# Patient Record
Sex: FEMALE | Race: WHITE | NOT HISPANIC OR LATINO | Employment: FULL TIME | URBAN - METROPOLITAN AREA
[De-identification: names, ages, dates, MRNs, and addresses within clinical notes are randomized per-mention and may not be internally consistent; named-entity substitution may affect disease eponyms.]

---

## 2018-09-20 ENCOUNTER — HOSPITAL ENCOUNTER (INPATIENT)
Facility: HOSPITAL | Age: 58
LOS: 5 days | Discharge: HOME | DRG: 918 | End: 2018-09-26
Attending: EMERGENCY MEDICINE | Admitting: PSYCHIATRY & NEUROLOGY

## 2018-09-20 DIAGNOSIS — T14.91XA SUICIDE ATTEMPT (HCC): Primary | ICD-10-CM

## 2018-09-20 DIAGNOSIS — T50.902A INTENTIONAL DRUG OVERDOSE, INITIAL ENCOUNTER (HCC): ICD-10-CM

## 2018-09-20 DIAGNOSIS — F33.2 SEVERE EPISODE OF RECURRENT MAJOR DEPRESSIVE DISORDER, WITHOUT PSYCHOTIC FEATURES (HCC): Chronic | ICD-10-CM

## 2018-09-20 DIAGNOSIS — F17.200 NICOTINE DEPENDENCE: ICD-10-CM

## 2018-09-20 DIAGNOSIS — R45.851 SUICIDAL IDEATIONS: ICD-10-CM

## 2018-09-20 LAB
ALBUMIN SERPL BCP-MCNC: 4.1 G/DL (ref 3.5–5)
ALP SERPL-CCNC: 65 U/L (ref 46–116)
ALT SERPL W P-5'-P-CCNC: 43 U/L (ref 12–78)
ANION GAP SERPL CALCULATED.3IONS-SCNC: 15 MMOL/L (ref 4–13)
APAP SERPL-MCNC: <2 UG/ML (ref 10–30)
AST SERPL W P-5'-P-CCNC: 20 U/L (ref 5–45)
BASOPHILS # BLD MANUAL: 0 THOUSAND/UL (ref 0–0.1)
BASOPHILS NFR MAR MANUAL: 0 % (ref 0–1)
BILIRUB SERPL-MCNC: 0.4 MG/DL (ref 0.2–1)
BUN SERPL-MCNC: 10 MG/DL (ref 5–25)
CALCIUM SERPL-MCNC: 9.6 MG/DL (ref 8.3–10.1)
CHLORIDE SERPL-SCNC: 103 MMOL/L (ref 100–108)
CO2 SERPL-SCNC: 24 MMOL/L (ref 21–32)
CREAT SERPL-MCNC: 0.81 MG/DL (ref 0.6–1.3)
EOSINOPHIL # BLD MANUAL: 0.26 THOUSAND/UL (ref 0–0.4)
EOSINOPHIL NFR BLD MANUAL: 3 % (ref 0–6)
ERYTHROCYTE [DISTWIDTH] IN BLOOD BY AUTOMATED COUNT: 12.8 % (ref 11.6–15.1)
ETHANOL SERPL-MCNC: 5 MG/DL (ref 0–3)
GFR SERPL CREATININE-BSD FRML MDRD: 82 ML/MIN/1.73SQ M
GLUCOSE SERPL-MCNC: 98 MG/DL (ref 65–140)
HCT VFR BLD AUTO: 45.2 % (ref 34.8–46.1)
HGB BLD-MCNC: 15.2 G/DL (ref 11.5–15.4)
LYMPHOCYTES # BLD AUTO: 3.35 THOUSAND/UL (ref 0.6–4.47)
LYMPHOCYTES # BLD AUTO: 38 % (ref 14–44)
MCH RBC QN AUTO: 31 PG (ref 26.8–34.3)
MCHC RBC AUTO-ENTMCNC: 33.6 G/DL (ref 31.4–37.4)
MCV RBC AUTO: 92 FL (ref 82–98)
MONOCYTES # BLD AUTO: 0.53 THOUSAND/UL (ref 0–1.22)
MONOCYTES NFR BLD: 6 % (ref 4–12)
NEUTROPHILS # BLD MANUAL: 4.32 THOUSAND/UL (ref 1.85–7.62)
NEUTS SEG NFR BLD AUTO: 49 % (ref 43–75)
NRBC BLD AUTO-RTO: 0 /100 WBCS
PLATELET # BLD AUTO: 271 THOUSANDS/UL (ref 149–390)
PLATELET BLD QL SMEAR: ADEQUATE
PMV BLD AUTO: 11 FL (ref 8.9–12.7)
POTASSIUM SERPL-SCNC: 4 MMOL/L (ref 3.5–5.3)
PROT SERPL-MCNC: 7.3 G/DL (ref 6.4–8.2)
RBC # BLD AUTO: 4.9 MILLION/UL (ref 3.81–5.12)
RBC MORPH BLD: NORMAL
SALICYLATES SERPL-MCNC: 6.9 MG/DL (ref 3–20)
SODIUM SERPL-SCNC: 142 MMOL/L (ref 136–145)
TOTAL CELLS COUNTED SPEC: 100
VARIANT LYMPHS # BLD AUTO: 4 %
WBC # BLD AUTO: 8.82 THOUSAND/UL (ref 4.31–10.16)

## 2018-09-20 PROCEDURE — 85007 BL SMEAR W/DIFF WBC COUNT: CPT | Performed by: EMERGENCY MEDICINE

## 2018-09-20 PROCEDURE — 81002 URINALYSIS NONAUTO W/O SCOPE: CPT | Performed by: EMERGENCY MEDICINE

## 2018-09-20 PROCEDURE — 85027 COMPLETE CBC AUTOMATED: CPT | Performed by: EMERGENCY MEDICINE

## 2018-09-20 PROCEDURE — 80053 COMPREHEN METABOLIC PANEL: CPT | Performed by: EMERGENCY MEDICINE

## 2018-09-20 PROCEDURE — 80320 DRUG SCREEN QUANTALCOHOLS: CPT | Performed by: EMERGENCY MEDICINE

## 2018-09-20 PROCEDURE — 93005 ELECTROCARDIOGRAM TRACING: CPT

## 2018-09-20 PROCEDURE — 96360 HYDRATION IV INFUSION INIT: CPT

## 2018-09-20 PROCEDURE — 80329 ANALGESICS NON-OPIOID 1 OR 2: CPT | Performed by: EMERGENCY MEDICINE

## 2018-09-20 RX ADMIN — SODIUM CHLORIDE 500 ML: 0.9 INJECTION, SOLUTION INTRAVENOUS at 23:53

## 2018-09-21 PROBLEM — F41.1 GENERALIZED ANXIETY DISORDER: Status: ACTIVE | Noted: 2018-09-21

## 2018-09-21 PROBLEM — F41.1 GENERALIZED ANXIETY DISORDER: Chronic | Status: ACTIVE | Noted: 2018-09-21

## 2018-09-21 PROBLEM — F33.2 SEVERE EPISODE OF RECURRENT MAJOR DEPRESSIVE DISORDER, WITHOUT PSYCHOTIC FEATURES (HCC): Status: ACTIVE | Noted: 2018-09-21

## 2018-09-21 PROBLEM — R45.851 SUICIDAL IDEATIONS: Status: ACTIVE | Noted: 2018-09-21

## 2018-09-21 PROBLEM — F33.2 SEVERE EPISODE OF RECURRENT MAJOR DEPRESSIVE DISORDER, WITHOUT PSYCHOTIC FEATURES (HCC): Chronic | Status: ACTIVE | Noted: 2018-09-21

## 2018-09-21 LAB
AMPHETAMINES SERPL QL SCN: NEGATIVE
ATRIAL RATE: 55 BPM
ATRIAL RATE: 59 BPM
BARBITURATES UR QL: NEGATIVE
BENZODIAZ UR QL: NEGATIVE
CLARITY, POC: ABNORMAL
COCAINE UR QL: NEGATIVE
COLOR, POC: ABNORMAL
EXT BILIRUBIN, UA: ABNORMAL
EXT BLOOD URINE: ABNORMAL
EXT GLUCOSE, UA: ABNORMAL
EXT KETONES: ABNORMAL
EXT NITRITE, UA: ABNORMAL
EXT PH, UA: 6
EXT PROTEIN, UA: ABNORMAL
EXT SPECIFIC GRAVITY, UA: 1
EXT UROBILINOGEN: 0.2
METHADONE UR QL: NEGATIVE
OPIATES UR QL SCN: NEGATIVE
P AXIS: 72 DEGREES
P AXIS: 75 DEGREES
PCP UR QL: NEGATIVE
PR INTERVAL: 158 MS
PR INTERVAL: 180 MS
QRS AXIS: 96 DEGREES
QRS AXIS: 96 DEGREES
QRSD INTERVAL: 80 MS
QRSD INTERVAL: 84 MS
QT INTERVAL: 414 MS
QT INTERVAL: 420 MS
QTC INTERVAL: 401 MS
QTC INTERVAL: 409 MS
T WAVE AXIS: 77 DEGREES
T WAVE AXIS: 82 DEGREES
THC UR QL: POSITIVE
VENTRICULAR RATE: 55 BPM
VENTRICULAR RATE: 59 BPM
WBC # BLD EST: ABNORMAL 10*3/UL

## 2018-09-21 PROCEDURE — 99222 1ST HOSP IP/OBS MODERATE 55: CPT | Performed by: PSYCHIATRY & NEUROLOGY

## 2018-09-21 PROCEDURE — 93010 ELECTROCARDIOGRAM REPORT: CPT | Performed by: INTERNAL MEDICINE

## 2018-09-21 PROCEDURE — 99252 IP/OBS CONSLTJ NEW/EST SF 35: CPT | Performed by: PHYSICIAN ASSISTANT

## 2018-09-21 PROCEDURE — 99285 EMERGENCY DEPT VISIT HI MDM: CPT

## 2018-09-21 PROCEDURE — 99449 NTRPROF PH1/NTRNET/EHR 31/>: CPT | Performed by: EMERGENCY MEDICINE

## 2018-09-21 PROCEDURE — 93005 ELECTROCARDIOGRAM TRACING: CPT

## 2018-09-21 PROCEDURE — 96361 HYDRATE IV INFUSION ADD-ON: CPT

## 2018-09-21 PROCEDURE — 80307 DRUG TEST PRSMV CHEM ANLYZR: CPT | Performed by: EMERGENCY MEDICINE

## 2018-09-21 RX ORDER — LORAZEPAM 1 MG/1
1 TABLET ORAL EVERY 4 HOURS PRN
Status: DISCONTINUED | OUTPATIENT
Start: 2018-09-21 | End: 2018-09-26 | Stop reason: HOSPADM

## 2018-09-21 RX ORDER — MIRTAZAPINE 15 MG/1
15 TABLET, FILM COATED ORAL
Status: DISCONTINUED | OUTPATIENT
Start: 2018-09-21 | End: 2018-09-26 | Stop reason: HOSPADM

## 2018-09-21 RX ORDER — LORAZEPAM 2 MG/ML
2 INJECTION INTRAMUSCULAR EVERY 4 HOURS PRN
Status: DISCONTINUED | OUTPATIENT
Start: 2018-09-21 | End: 2018-09-26 | Stop reason: HOSPADM

## 2018-09-21 RX ORDER — MAGNESIUM HYDROXIDE/ALUMINUM HYDROXICE/SIMETHICONE 120; 1200; 1200 MG/30ML; MG/30ML; MG/30ML
30 SUSPENSION ORAL EVERY 4 HOURS PRN
Status: DISCONTINUED | OUTPATIENT
Start: 2018-09-21 | End: 2018-09-26 | Stop reason: HOSPADM

## 2018-09-21 RX ORDER — OLANZAPINE 5 MG/1
5 TABLET ORAL
Status: DISCONTINUED | OUTPATIENT
Start: 2018-09-21 | End: 2018-09-26 | Stop reason: HOSPADM

## 2018-09-21 RX ORDER — TRAZODONE HYDROCHLORIDE 50 MG/1
50 TABLET ORAL
Status: DISCONTINUED | OUTPATIENT
Start: 2018-09-21 | End: 2018-09-26 | Stop reason: HOSPADM

## 2018-09-21 RX ORDER — NICOTINE 21 MG/24HR
14 PATCH, TRANSDERMAL 24 HOURS TRANSDERMAL ONCE
Status: DISCONTINUED | OUTPATIENT
Start: 2018-09-21 | End: 2018-09-21 | Stop reason: SDUPTHER

## 2018-09-21 RX ORDER — NICOTINE 21 MG/24HR
1 PATCH, TRANSDERMAL 24 HOURS TRANSDERMAL DAILY
Status: DISCONTINUED | OUTPATIENT
Start: 2018-09-21 | End: 2018-09-26 | Stop reason: HOSPADM

## 2018-09-21 RX ORDER — BENZTROPINE MESYLATE 1 MG/1
1 TABLET ORAL EVERY 6 HOURS PRN
Status: DISCONTINUED | OUTPATIENT
Start: 2018-09-21 | End: 2018-09-26 | Stop reason: HOSPADM

## 2018-09-21 RX ORDER — ACETAMINOPHEN 325 MG/1
650 TABLET ORAL EVERY 6 HOURS PRN
Status: DISCONTINUED | OUTPATIENT
Start: 2018-09-21 | End: 2018-09-22

## 2018-09-21 RX ORDER — BENZTROPINE MESYLATE 1 MG/ML
1 INJECTION INTRAMUSCULAR; INTRAVENOUS EVERY 6 HOURS PRN
Status: DISCONTINUED | OUTPATIENT
Start: 2018-09-21 | End: 2018-09-26 | Stop reason: HOSPADM

## 2018-09-21 RX ADMIN — NICOTINE 1 PATCH: 21 PATCH, EXTENDED RELEASE TRANSDERMAL at 14:13

## 2018-09-21 RX ADMIN — NICOTINE 14 MG: 14 PATCH, EXTENDED RELEASE TRANSDERMAL at 02:21

## 2018-09-21 RX ADMIN — MIRTAZAPINE 15 MG: 15 TABLET, FILM COATED ORAL at 21:13

## 2018-09-21 NOTE — CONSULTS
TELEPSYCHIATRY TELEPHONE ADMISSION NOTE      I was consulted by phone to review the case of this 51yo woman who was medically cleared and admitted for suicide attempt via overdose on Trazodone  PMH negative for significant medical issues  NKDA  AVSS, reassuring admission labs  UDS+cannabis  BAL 5  Impression:  Depressive disorder, unspecified    Plan:   --Admit to psychiatry under voluntary status  --Suicide precautions  --Routine admission orders placed           Lindajo Sever, MD   Psychiatrist

## 2018-09-21 NOTE — DISCHARGE INSTR - OTHER ORDERS
In your discharge folder is a return to work note  Included in discharge folder is information on MARLYN ([address] on Mental Illness) which overs various support groups/education/resources  [facility]  24/7: [phone]  [place]  [address]    [facility] has 3 crisis centers:   [employer] at [facility] at [facility] at [facility] services are accessible by phone and through three [facility] hospital Emergency Rooms to individuals, families, and law enforcement  Psychiatric and treatment planning is available, providing for evaluation and disposition and the immediate start of medication and treatment  Substance abuse crisis issues are also addressed  Access to psychiatric consultation is limited to a few hours each weekday  These services are available 24/7 at [facility] AT [facility] at [facility] ([facility] [address]; [phone]) and [employer] at [facility], [address] [facility]; [phone]  The most active crisis unit is [facility] AT [facility] at [address], [address] [place] [address]; [phone])  Suicide Prevention Lifeline  (972) 343-TALK or (579) New 0657 (869) 093-VGAO    Crisis Text Line is free, 24/7 support for those in crisis  Text HOME to 822533 from anywhere in the [place] to text with a trained Crisis Counselor  Peer Recovery Warmline through [facility] AT [facility], hours of operation are 1:00 PM to 5:00 PM, Monday through Friday, except major holidays    Warmline#:258-345-5408    [facility] [address] [phone] ·   [address]  [place], [place], On license of [facility]  Provides free, anonymous and confidential telephone helpline services to help with severe mental health issues to financial concerns, family issues or simply a desire to be heard
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
## 2018-09-21 NOTE — PROGRESS NOTES
Belongings Brought In:  Slippers  deoderant  Toothpaste  Toothbrush  Pants  Fleece jacket  Shirts  Underwear  Socks  Bra  Makeup  Papers from [employer]

## 2018-09-21 NOTE — ED NOTES
There was confusion earlier when patient arrived because the police officers who brought her in completed the petitioner section of a 302 and then contacted [facility] and had it delegated prior to the patient being brought to the ED  They did not bring the 302, however, plus when the patient arrived it was discovered that she wanted to sign in voluntarily and had never been offered that option  By that time there was a delegated 302 at the [facility] office, but none present here with the patient  The patient was given and did sign a 201 instead  [facility] worker called to say she had the 36 and either someone could come get it or she could try to have it brought over in he am  Explained the situation and the patient's desire for a 201  [place] crisis worker contacted the [employer], Everardo Price, who instructed that our physician should complete only page 7 of a 302 indicating the patient signed voluntarily, and then it should be faxed to her office  Form completed properly by Dr Padma Khanna and faxed to [facility] office as requested  Signed 201 on patient's chart  Patient reporting she has no medical insurance  Her social security number was not on the chart  She reported it was   EVS confirms that social is not on file  Patient referred to [facility]

## 2018-09-21 NOTE — ED PROVIDER NOTES
History  Chief Complaint   Patient presents with    Overdose - Intentional     pt presents to ER after driving out to somewhere seculded and taking eight 50mg pills of her trazadone  pt then waited about 2 hours before calling 80      56year old female brought in by EMS for evaluation after suicide attempt  Patient had taken 400 mg trazodone and then drove to an abandoned parking lot in an attempt to end her life  Approximately 2 hours later she had regretted her decision and was afraid so called 911  Patient states that she has been feeling overwhelmed and had an argument with her  which triggered the sensation of sudden, severe hopelessness resulting in her attempt  Patient had been seeing a therapist with 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  No prior suicide attempts or psychiatric admisions  No chest pain, shortness of breath, lightheadedness, dizziness or palpitations  History provided by:  Patient and EMS personnel  Overdose - Intentional   Ingested substance:  Prescription medication  Time since incident:  2 hours  Ingestion amount:  400 mg trazodone  Witnesses present: no    Called poison control: no    Incident location: injested at home and then drove to a parking lot  Context: intentional ingestion and suicide attempt    Associated symptoms: no abdominal pain, no chest pain, no cough, no diarrhea, no headaches, no nausea, no shortness of breath and no vomiting    Risk factors: no hx of self injury and no hx of sucide attempts        Prior to Admission Medications   Prescriptions Last Dose Informant Patient Reported? Taking? TRAZODONE HCL PO   Yes Yes   Sig: Take 50 mg by mouth daily at bedtime      Facility-Administered Medications: None       History reviewed   No pertinent past medical history  Past Surgical History:   Procedure Laterality Date    HYSTERECTOMY         History reviewed  No pertinent family history  I have reviewed and agree with the history as documented  Social History   Substance Use Topics    Smoking status: Current Every Day Smoker     Packs/day: 0 50    Smokeless tobacco: Never Used    Alcohol use Yes      Comment: "sometimes"        Review of Systems   Constitutional: Negative for appetite change, chills and fever  HENT: Negative for congestion, rhinorrhea and sore throat  Respiratory: Negative for cough, chest tightness and shortness of breath  Cardiovascular: Negative for chest pain, palpitations and leg swelling  Gastrointestinal: Negative for abdominal pain, constipation, diarrhea, nausea and vomiting  Genitourinary: Negative for dysuria, frequency and hematuria  Musculoskeletal: Negative for myalgias, neck pain and neck stiffness  Skin: Negative for pallor  Neurological: Negative for syncope, weakness and headaches  Psychiatric/Behavioral: Positive for suicidal ideas  All other systems reviewed and are negative  Physical Exam  Physical Exam   Constitutional: She is oriented to person, place, and time  She appears well-developed and well-nourished  Non-toxic appearance  No distress  HENT:   Head: Normocephalic and atraumatic  Eyes: Conjunctivae and EOM are normal  Pupils are equal, round, and reactive to light  Neck: Normal range of motion  Neck supple  No tracheal deviation present  No thyromegaly present  Cardiovascular: Normal rate, regular rhythm, normal heart sounds and intact distal pulses  Pulmonary/Chest: Effort normal and breath sounds normal    Abdominal: Soft  Bowel sounds are normal  She exhibits no distension  There is no tenderness  Lymphadenopathy:     She has no cervical adenopathy  Neurological: She is alert and oriented to person, place, and time  Skin: Skin is warm and dry  She is not diaphoretic  Psychiatric: She exhibits a depressed mood  She expresses suicidal ideation  She expresses no homicidal ideation  She expresses suicidal plans  Tearful    Nursing note and vitals reviewed  Vital Signs  ED Triage Vitals   Temperature Pulse Respirations Blood Pressure SpO2   09/20/18 2218 09/20/18 2211 09/20/18 2211 09/20/18 2211 09/20/18 2211   97 9 °F (36 6 °C) 71 20 157/85 98 %      Temp Source Heart Rate Source Patient Position - Orthostatic VS BP Location FiO2 (%)   09/20/18 2218 09/20/18 2211 09/20/18 2211 09/20/18 2211 --   Temporal Monitor Lying Right arm       Pain Score       09/20/18 2211       No Pain           Vitals:    09/20/18 2300 09/20/18 2315 09/20/18 2330 09/21/18 0045   BP: 121/66 105/57 98/53 105/55   Pulse: 62 62 58 63   Patient Position - Orthostatic VS:           Visual Acuity      ED Medications  Medications   nicotine (NICODERM CQ) 14 mg/24hr TD 24 hr patch 14 mg (14 mg Transdermal Medication Applied 9/21/18 0221)    EMS REPLENISHMENT MED ( Does not apply Given to EMS 9/20/18 2246)   sodium chloride 0 9 % bolus 500 mL (0 mL Intravenous Stopped 9/21/18 0132)       Diagnostic Studies  Results Reviewed     Procedure Component Value Units Date/Time    Rapid drug screen, urine [[id number]]  (Abnormal) Collected:  09/21/18 0114    Lab Status:  Final result Specimen:  Urine from Urine, Clean Catch Updated:  09/21/18 0146     Amph/Meth UR Negative     Barbiturate Ur Negative     Benzodiazepine Urine Negative     Cocaine Urine Negative     Methadone Urine Negative     Opiate Urine Negative     PCP Ur Negative     THC Urine Positive (A)    Narrative:         Presumptive report  If requested, specimen will be sent to reference lab for confirmation  FOR MEDICAL PURPOSES ONLY  IF CONFIRMATION NEEDED PLEASE CONTACT THE LAB WITHIN 5 DAYS      Drug Screen Cutoff Levels:  AMPHETAMINE/METHAMPHETAMINES  1000 ng/mL  BARBITURATES     200 ng/mL  BENZODIAZEPINES     200 ng/mL  COCAINE      300 ng/mL  METHADONE      300 ng/mL  OPIATES      300 ng/mL  PHENCYCLIDINE     25 ng/mL  THC       50 ng/mL    POCT urinalysis dipstick [[id number]]  (Abnormal) Resulted:  09/21/18 0117    Lab Status:  Final result Specimen:  Urine Updated:  09/21/18 0117     Color, UA straw     Clarity, UA hazy     EXT Glucose, UA (Ref: Negative) neg     EXT Bilirubin, UA (Ref: Negative) neg     Ketones, UA (Ref: Negative) neg     EXT Spec Grav, UA (Ref:1 003-1 030) 1 005     Blood, UA (Ref: Negative) neg     EXT pH, UA (Ref: 4 5-8 0) 6 0     EXT Protein, UA (Ref: Negative) neg     Urobilinogen, UA (Ref: 0 2- 1 0) 0 2      Leukocytes, UA (Ref: Negative) trace     Nitrite, UA (Ref: Negative) neg    CBC and differential [[id number]] Collected:  09/20/18 2236    Lab Status:  Final result Specimen:  Blood from Arm, Left Updated:  09/20/18 2346     WBC 8 82 Thousand/uL      RBC 4 90 Million/uL      Hemoglobin 15 2 g/dL      Hematocrit 45 2 %      MCV 92 fL      MCH 31 0 pg      MCHC 33 6 g/dL      RDW 12 8 %      MPV 11 0 fL      Platelets 585 Thousands/uL      nRBC 0 /100 WBCs     Narrative: This is an appended report  These results have been appended to a previously verified report      Acetaminophen level [[id number]]  (Abnormal) Collected:  09/20/18 2237    Lab Status:  Final result Specimen:  Blood from Arm, Left Updated:  09/20/18 2333     Acetaminophen Level <2 (L) ug/mL     Comprehensive metabolic panel [[id number]]  (Abnormal) Collected:  09/20/18 2237    Lab Status:  Final result Specimen:  Blood from Arm, Left Updated:  09/20/18 2319     Sodium 142 mmol/L      Potassium 4 0 mmol/L      Chloride 103 mmol/L      CO2 24 mmol/L      ANION GAP 15 (H) mmol/L      BUN 10 mg/dL      Creatinine 0 81 mg/dL      Glucose 98 mg/dL      Calcium 9 6 mg/dL      AST 20 U/L      ALT 43 U/L      Alkaline Phosphatase 65 U/L      Total Protein 7 3 g/dL      Albumin 4 1 g/dL      Total Bilirubin 0 40 mg/dL      eGFR 82 ml/min/1 73sq m     Narrative: National Kidney Disease Education Program recommendations are as follows:  GFR calculation is accurate only with a steady state creatinine  Chronic Kidney disease less than 60 ml/min/1 73 sq  meters  Kidney failure less than 15 ml/min/1 73 sq  meters  Salicylate level [[id number]]  (Normal) Collected:  09/20/18 2237    Lab Status:  Final result Specimen:  Blood from Arm, Left Updated:  94/72/86 2397     Salicylate Lvl 6 9 mg/dL     Ethanol [[id number]]  (Abnormal) Collected:  09/20/18 2236    Lab Status:  Final result Specimen:  Blood from Arm, Left Updated:  09/20/18 2315     Ethanol Lvl 5 (H) mg/dL                  No orders to display              Procedures  ECG 12 Lead Documentation  Date/Time: 9/20/2018 10:07 PM  Performed by: Yomaira Smith by: Jen Mon     Indications / Diagnosis:  Overdose  ECG reviewed by me, the ED Provider: yes    Patient location:  ED  Previous ECG:     Previous ECG:  Unavailable  Interpretation:     Interpretation: non-specific    Rate:     ECG rate:  59    ECG rate assessment: bradycardic    Rhythm:     Rhythm: sinus bradycardia    Ectopy:     Ectopy: none    QRS:     QRS axis:  Right    QRS intervals:  Normal  Conduction:     Conduction: normal    ST segments:     ST segments:  Normal  T waves:     T waves: inverted      Inverted:  AVL and V2           Phone Contacts  ED Phone Contact    ED Course  ED Course as of Sep 21 0421   Fri Sep 21, 2018   0029 Patient signed 12    0151 THC URINE: (!) Positive                               MDM  Number of Diagnoses or Management Options  Intentional drug overdose, initial encounter [facility]): new and requires workup  Diagnosis management comments: 56year old female brought in for evaluation after suicide attempt  No clonus  Hemodynamically stable  EKG unremarkable  Case discussed with  on call  Patient monitored in the emergency department on cardiac monitor with no acute events   Medically cleared for psychiatric evaluation  Crisis consulted  Patient signed 12 for voluntary psychiatric admission  Amount and/or Complexity of Data Reviewed  Clinical lab tests: ordered and reviewed    Patient Progress  Patient progress: stable    CritCare Time    Disposition  Final diagnoses:   Intentional drug overdose, initial encounter ([place] [address] )   Suicide attempt [facility])     Time reflects when diagnosis was documented in both MDM as applicable and the Disposition within this note     Time User Action Codes Description Comment    9/20/2018 11:43 PM Louetta Charline Add [T50 902A] Intentional drug overdose, initial encounter ([place] [address] )     9/21/2018  3:25 AM Summer Bard C Add [R45 851] Suicidal ideations     9/21/2018  3:25 AM Summer Bard C Modify [R45 851] Suicidal ideations     9/21/2018  4:21 AM Prashant Singh Add Red Adriane Suicide attempt ([place] [address] )     9/21/2018  4:21 AM Louetta Charline Modify [T50 902A] Intentional drug overdose, initial encounter ([place] [address] )     9/21/2018  4:21 AM Prashant Singh Modify Red Adriane Suicide attempt [facility])       ED Disposition     ED Disposition Condition Comment    Transfer to [address] [place] [address] should be transferred to [facility] and has been medically cleared  MD Documentation      Most Recent Value   Accepting Physician  Rene Diaz Name, Nissa Mcnair [place]   Sending MD  [facility] Name, [address] [place] [address]      Follow-up Information    None         Patient's Medications   Discharge Prescriptions    No medications on file     No discharge procedures on file      ED Provider  Electronically Signed by           Teddy Duffy MD  09/21/18 4866

## 2018-09-21 NOTE — PLAN OF CARE
Problem: DISCHARGE PLANNING  Goal: Discharge to home or other facility with appropriate resources  INTERVENTIONS:  - Identify barriers to discharge w/patient and caregiver  - Arrange for needed discharge resources and transportation as appropriate  - Identify discharge learning needs (meds, wound care, etc )  - Arrange for interpretive services to assist at discharge as needed  - Refer to Case Management Department for coordinating discharge planning if the patient needs post-hospital services based on physician/advanced practitioner order or complex needs related to functional status, cognitive ability, or social support system   Outcome: Progressing  Pt met with CW to review & sign Tx Plan & sign ROIs & complete intake

## 2018-09-21 NOTE — TREATMENT PLAN
RN will meet with pt at least 2x per day to discuss treatment plan, medications, diagnosis and coping skills

## 2018-09-21 NOTE — ED NOTES
Patient is accepted at [address]  Patient is accepted by Dr Regulo Ignacio per Cy        Patient may go to the floor at [address]  Nurse report is to be called to  prior to patient transfer
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
## 2018-09-21 NOTE — ED NOTES
Asked pt if she was able to provide urine sample  Pt unable to provide at this time       Yenifer Carmichael  09/20/18 8302

## 2018-09-21 NOTE — TREATMENT PLAN
TREATMENT PLAN REVIEW - [address] 56 y o  1960 female MRN: [id number]    [address] [place] [address] Room / Bed: Mariusz Erazo [address] [place] [address] [id number] Encounter: [id number]          Admit Date/Time:  9/20/2018 10:10 PM    Treatment Team: Attending Provider: Josh Butt; Patient Care Technician: Dean Martinez; : Joaquina De La Garza;  Occupational Therapy Assistant: ANANDA Mackey    Diagnosis: Principal Problem:    Severe episode of recurrent major depressive disorder, without psychotic features ([facility] [address] )  Active Problems:    Generalized anxiety disorder    Patient Strengths/Assets: cooperative, good past treatment response, motivation for treatment/growth, patient is on a voluntary commitment    Patient Barriers/Limitations: limited motivation, low self esteem, marital/family conflict    Short Term Goals: decrease in depressive symptoms, decrease in anxiety symptoms, decrease in suicidal thoughts    Long Term Goals: improvement in depression, improvement in anxiety, stabilization of mood, free of suicidal thoughts, acceptance of need for psychiatric medications, acceptance of need for psychiatric treatment, acceptance of need for psychiatric follow up after discharge    Progress Towards Goals: starting psychiatric medications as prescribed    Recommended Treatment: medication management, patient medication education, group therapy, milieu therapy, continued Behavioral Health psychiatric evaluation/assessment process    Treatment Frequency: daily medication monitoring, group and milieu therapy daily, monitoring through interdisciplinary rounds, monitoring through weekly patient care conferences    Expected Discharge Date: 5-7 days    Discharge Plan: referral for outpatient medication management with a psychiatrist, referral for outpatient psychotherapy    Treatment Plan Created/Updated By: Josh Butt

## 2018-09-21 NOTE — CONSULTS
PHONE [facility] Toxicology  Hakeem Samayoa 56 y o  female MRN: [id number]  Unit/Bed#: ED 03 Encounter: [id number]      Reason for Consult / Principal Problem: overedose  Inpatient consult to Toxicology  Consult performed by: Monika Manzano ordered by: Anuradha Godwin        09/21/18  11:30PM    ASSESSMENT:  56year-old female with acute, intentional trazodone overdose  RECOMMENDATIONS:  Patient may be cleared from toxicological perspective if she remains asymptomatic for four hours post ingestion  Her current reported assessment is very reassuring that she did not ingest a significantly toxic dose of trazodone and that she will not develop toxicity  Should she develop any symptoms as listed below in trazodone discussion, please admit for observation with telemetry until asymptomatic and provide symptomatic and supportive care as also described below  Patient will need psychiatric evaluation  For further questions, please call [facility]  Service or Patient Access Center to reach the medical  on call  I am available on [employer]      ____________________________________________________________________________________________    Please see additional teaching note below (if available)    Department of Medical Toxicology  [facility]  Trazodone Toxicity Discussion    Background:  Trazodone is an atypical tetracyclic antidepressant with anxiolytic and hypnotic properties  It is primarily used to treat depression and in some cases, to aid in sleep  Pharmacology:   Mechanisms of Action:   Trazodone increases serotonin activity by three mechanisms:   1  8-agyzuxjlokerretys-3Q (5HT2A) receptor antagonism   2  Selective reuptake inhibition  3  Postsynaptic serotonin agonism via the active metabolite, m-chlorophenyl piperazine  Additionally, it is also an alpha-1 adrenergic receptor antagonist     Maximum daily dose is 400-600mg   Immediate release trazodone is absorbed rapidly with peak serum concentrations occurring in 1-2 hours after dosing  Serum concentration may not peak until at least 9 hours with extended release formulary  Toxicity:  Inadvertent ingestions are rarely associated with toxicity  Overdoses generally produce mild to moderate toxicity  Symptoms may include drowsiness, lethargy, ataxia, nausea, vomiting, myoclonus, seizures, bradycardia, hypotension, and priapism  There are also reports of QT prolongation, torsades de pointes, and serotonin syndrome  Treatment:  Treatment is supportive  Asymptomatic patients can be observed for 4-6 hours if immediate release is ingested and 12-16 hours if extended release is ingested  If no development of symptoms during that timeframe, then the patient will unlikely become ill  Symptomatic patients should be admitted  Hypotension is treated with intravenous fluids, and if necessary, vasopressors  Bradycardia associated with hypotension may be treated with atropine or vasopressors  Seizures can be treated with benzodiazepines  QT prolongation should be treated with electrolyte optimization  Resources: [employer]  Last updated 9/21/18  __________________________________________________________________________________________      Hx and PE limited by the dynamics of a phone consultation  I have not personally interviewed or evaluated the patient, but only advised based on the information provided to me  Primary provider is responsible for all clinical decisions  Pertinent history, physical exam and clinical findings and course discussed: Leighton Nelson is a 56y o  year old female who presents with report of overdose on trazodone at 7:30pm  She reported taking eight 50mg tablets  She was asymptomatic in the ED three hours later and had a normal ECG  I was called to discuss recommendations for observation time       Review of systems and physical exam not performed by me  Historical Information   History reviewed  No pertinent past medical history  Past Surgical History:   Procedure Laterality Date    HYSTERECTOMY       Social History   History   Alcohol Use    Yes     Comment: "sometimes"     History   Drug Use No     History   Smoking Status    Current Every Day Smoker    Packs/day: 0 50   Smokeless Tobacco    Never Used     History reviewed  No pertinent family history  Prior to Admission medications    Medication Sig Start Date End Date Taking? Authorizing Provider   TRAZODONE HCL PO Take 50 mg by mouth daily at bedtime   Yes Historical Provider, MD       Current Facility-Administered Medications   Medication Dose Route Frequency    sodium chloride 0 9 % bolus 500 mL  500 mL Intravenous Once       No Known Allergies    Objective     No intake or output data in the 24 hours ending 09/21/18 0013    Invasive Devices:   Peripheral IV 09/20/18 Left Antecubital (Active)   Site Assessment Clean;Dry; Intact 9/20/2018 10:15 PM   Dressing Type Transparent 9/20/2018 10:15 PM   Line Status Blood return noted;Capped;Flushed 9/20/2018 10:15 PM   Dressing Status Clean;Dry; Intact 9/20/2018 10:15 PM       Vitals   Vitals:    09/20/18 2245 09/20/18 2300 09/20/18 2315 09/20/18 2330   BP: 130/69 121/66 105/57 98/53   TempSrc:       Pulse: 62 62 62 58   Resp: 18 20 20 20   Patient Position - Orthostatic VS:       Temp:             EKG, Pathology, and/or Other Studies: I have personally discussed the ECG with the primary emergency physician   NSR 59 BPM, QRS 80 ms, QTc 409 ms  Lab Results: I have personally reviewed pertinent reports        Labs:    Results from last 7 days  Lab Units 09/20/18  2236   WBC Thousand/uL 8 82   HEMOGLOBIN g/dL 15 2   HEMATOCRIT % 45 2   PLATELETS Thousands/uL 271   LYMPHO PCT % 38   MONO PCT MAN % 6      Results from last 7 days  Lab Units 09/20/18  2237   SODIUM mmol/L 142   POTASSIUM mmol/L 4 0   CHLORIDE mmol/L 103   CO2 mmol/L 24   BUN mg/dL 10   CREATININE mg/dL 0 81   CALCIUM mg/dL 9 6   ALK PHOS U/L 65   ALT U/L 43   AST U/L 20              No results found for: TROPONINI        Results from last 7 days  Lab Units 09/20/18 2237 09/20/18 2236   ACETAMINOPHEN LVL ug/mL <2*  --    ETHANOL LVL mg/dL  --  5*   SALICYLATE LVL mg/dL 6 9  --            Counseling / Coordination of Care  Total time spent today 52 minutes  This was a phone consultation
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
## 2018-09-21 NOTE — CONSULTS
Consultation - Kim Urrutia 56 y o  female MRN: [id number]    Unit/Bed#: Rodena Koyan[facility] 468-59 Encounter: [id number]      Assessment/Plan     Assessment/Plan:  1  Major depressive disorder severe episode with SA  -Medically cleared for inpatient psychiatric evaluation and treatment    2  Nicotine abuse  -wishes to quit smoking    History of Present Illness   HPI: Kim Urrutia is a 56y o  year old female who presents following a suicide attempt last evening by taking trazodone  Reports increased stressors of her has been asking for divorce  She denies chronic health history, recent illness, open wounds or pain  She reports heavy cigarette smoking in the last couple of months and wishes to quit  Inpatient consult for Medical Clearance for [facility] patient  Consult performed by: Rachid Lyman  Consult ordered by: [place]          Review of Systems   Constitutional: Negative  HENT: Negative  Eyes: Negative  Respiratory: Negative  Cardiovascular: Negative  Gastrointestinal: Negative  Genitourinary: Negative  Skin: Negative  Neurological: Negative  Psychiatric/Behavioral: Positive for decreased concentration, dysphoric mood, sleep disturbance (unable to sleep) and suicidal ideas (and attempt)  The patient is nervous/anxious and is hyperactive  Historical Information   Past Medical History:   Diagnosis Date    Psychiatric disorder      Past Surgical History:   Procedure Laterality Date    HYSTERECTOMY       Social History   History   Alcohol Use    0 6 oz/week    1 Glasses of wine per week     Comment: "sometimes"     History   Drug Use No     History   Smoking Status    Current Every Day Smoker    Packs/day: 0 50   Smokeless Tobacco    Never Used     Family History: non-contributory    Meds/Allergies   PTA meds:   Prior to Admission Medications   Prescriptions Last Dose Informant Patient Reported? Taking?    TRAZODONE HCL PO   Yes Yes   Sig: Take 50 mg by mouth daily at bedtime      Facility-Administered Medications: None     No Known Allergies    Objective   Vitals: Blood pressure 105/65, pulse 72, temperature 98 7 °F (37 1 °C), temperature source Tympanic, resp  rate 16, height 5' 6" (1 676 m), weight 51 5 kg (113 lb 9 6 oz), SpO2 94 %  Intake/Output Summary (Last 24 hours) at 09/21/18 1527  Last data filed at 09/21/18 0132   Gross per 24 hour   Intake              500 ml   Output                0 ml   Net              500 ml     Invasive Devices          No matching active lines, drains, or airways          Physical Exam   Constitutional: She is oriented to person, place, and time  She appears well-developed and well-nourished  No distress  HENT:   Head: Normocephalic and atraumatic  Eyes: EOM are normal  Pupils are equal, round, and reactive to light  Neck: Normal range of motion  Cardiovascular: Normal rate and regular rhythm  Exam reveals no gallop and no friction rub  No murmur heard  Pulmonary/Chest: Effort normal  She has no wheezes  She has no rales  Abdominal: Soft  She exhibits no distension  There is no tenderness  There is no rebound  Musculoskeletal: Normal range of motion  Neurological: She is alert and oriented to person, place, and time  Skin: Skin is warm and dry  Psychiatric: Thought content normal  Her mood appears anxious  Her speech is rapid and/or pressured  She is hyperactive  She exhibits a depressed mood  Lab Results: I have personally reviewed pertinent reports  Imaging Studies: I have personally reviewed pertinent reports

## 2018-09-21 NOTE — ED NOTES
Patient states she is feeling better this am   Patient aware that she will be going upstairs for admission after breakfast     Jayla Bates RN  09/21/18 0800

## 2018-09-22 PROCEDURE — 99232 SBSQ HOSP IP/OBS MODERATE 35: CPT | Performed by: PSYCHIATRY & NEUROLOGY

## 2018-09-22 RX ORDER — IBUPROFEN 600 MG/1
600 TABLET ORAL EVERY 6 HOURS PRN
Status: DISCONTINUED | OUTPATIENT
Start: 2018-09-22 | End: 2018-09-26 | Stop reason: HOSPADM

## 2018-09-22 RX ORDER — ACETAMINOPHEN 325 MG/1
650 TABLET ORAL EVERY 6 HOURS PRN
Status: DISCONTINUED | OUTPATIENT
Start: 2018-09-22 | End: 2018-09-26 | Stop reason: HOSPADM

## 2018-09-22 RX ORDER — ACETAMINOPHEN 325 MG/1
975 TABLET ORAL EVERY 6 HOURS PRN
Status: DISCONTINUED | OUTPATIENT
Start: 2018-09-22 | End: 2018-09-26 | Stop reason: HOSPADM

## 2018-09-22 RX ADMIN — MIRTAZAPINE 15 MG: 15 TABLET, FILM COATED ORAL at 21:19

## 2018-09-22 RX ADMIN — NICOTINE 1 PATCH: 21 PATCH, EXTENDED RELEASE TRANSDERMAL at 10:00

## 2018-09-22 NOTE — PROGRESS NOTES
Pt   Very quiet  On BHU  This  Pm   Pt   Is  Pleasant  And  Co-operative  Appetite is  Poor  Pt   Remains  Very  Depressed  Denies  SI  At  Present

## 2018-09-22 NOTE — PROGRESS NOTES
Progress Note - 20 [facility] 56 y o  female MRN: [id number]   Unit/Bed#: Na [facility] 803-05 Encounter: [id number]    Behavior over the last 24 hours: slowly improving  Cathy Jefferson states she feels less depressed, rates mood as 2 on a scale of 1 (best mood) to 10 (worst mood)  She still seems anxious, although she reports that anxiety is improved as well  She denies suicidal thoughts today  Remorseful now about her suicide attempt "I am glad to be alive and here"  Compliant with medications  Attends group therapy  Sleep: slept better  Appetite: improving  Medication side effects: No   ROS: no complaints, denies any shortness of breath, chest pain or abdominal pain    Mental Status Evaluation:    Appearance:  casually dressed   Behavior:  cooperative, fair eye contact   Speech:  normal rate, soft   Mood:  anxious, less depressed   Affect:  constricted   Thought Process:  circumstantial   Associations: circumstantial associations   Thought Content:  no overt delusions   Perceptual Disturbances: no auditory hallucinations, no visual hallucinations   Risk Potential: Suicidal ideation - None at present, status post suicide attempt by overdose, remorseful about suicide attempt  Homicidal ideation - None  Potential for aggression - No   Sensorium:  oriented to person, place and time/date   Memory:  recent and remote memory grossly intact   Consciousness:  alert and awake   Attention: attention span and concentration appear shorter than expected for age   Insight:  improving and moderate   Judgment: improving and moderate   Gait/Station: normal gait/station and normal balance   Motor Activity: no abnormal movements     Vital signs in last 24 hours:    Temp:  [97 2 °F (36 2 °C)-98 7 °F (37 1 °C)] 97 2 °F (36 2 °C)  HR:  [62-72] 62  Resp:  [16-18] 18  BP: ()/(52-79) 89/52    Laboratory results:  I have personally reviewed all pertinent laboratory/tests results      Most Recent Labs:   Lab Results Component Value Date    WBC 8 82 09/20/2018    RBC 4 90 09/20/2018    HGB 15 2 09/20/2018    HCT 45 2 09/20/2018     09/20/2018    RDW 12 8 09/20/2018     09/20/2018    K 4 0 09/20/2018     09/20/2018    CO2 24 09/20/2018    BUN 10 09/20/2018    CREATININE 0 81 09/20/2018    GLUC 98 09/20/2018    CALCIUM 9 6 09/20/2018    AST 20 09/20/2018    ALT 43 09/20/2018    ALKPHOS 65 09/20/2018    TP 7 3 09/20/2018    ALB 4 1 09/20/2018    TBILI 0 40 09/20/2018   Drug Screen:   Lab Results   Component Value Date    AMPMETHUR Negative 09/21/2018    BARBTUR Negative 09/21/2018    BDZUR Negative 09/21/2018    THCUR Positive (A) 09/21/2018    COCAINEUR Negative 09/21/2018    METHADONEUR Negative 09/21/2018    OPIATEUR Negative 09/21/2018    PCPUR Negative 09/21/2018       Progress Toward Goals: progressing, still anxious, less depressed, no longer suicidal, working on coping skills    Assessment/Plan   Principal Problem:    Severe episode of recurrent major depressive disorder, without psychotic features ([facility] [address] )  Active Problems:    Generalized anxiety disorder    Recommended Treatment:     Planned medication and treatment changes: All current active medications have been reviewed    Encourage group therapy, milieu therapy and occupational therapy  Behavioral Health checks every 5 minutes    Continue current medications:    Current Facility-Administered Medications:  acetaminophen 650 mg Oral Q6H PRN Krista Hoskins MD   acetaminophen 975 mg Oral Q6H PRN Krista Hoskins MD   aluminum-magnesium hydroxide-simethicone 30 mL Oral Q4H PRN Tony Bernal MD   benztropine 1 mg Intramuscular Q6H PRN Tony Bernal MD   benztropine 1 mg Oral Q6H PRN Tony Bernal MD   ibuprofen 600 mg Oral Q6H PRN Krista Hoskins MD   LORazepam 2 mg Intramuscular Q4H PRN Tony Bernal MD   LORazepam 1 mg Oral Q4H PRN Tony Bernal MD   magnesium hydroxide 30 mL Oral Daily PRN Tony Bernal MD   mirtazapine 15 mg Oral HS Bryn Leos   nicotine 1 patch Transdermal Daily Suyapa Valero MD   OLANZapine 5 mg Oral Q3H PRN Suyapa Valero MD   traZODone 50 mg Oral HS PRN Suyapa Valero MD       Risks / Benefits of Treatment:    Risks, benefits, and possible side effects of medications explained to patient and patient verbalizes understanding and agreement for treatment  Counseling / Coordination of Care:    Patient's progress reviewed with nursing staff  Medications, treatment progress and treatment plan reviewed with patient      Izabel Santoyo MD 09/22/18

## 2018-09-22 NOTE — PROGRESS NOTES
Pt awake and alert, pleasant and cooperative with staff  Denies SI/HI  Reports mild anxiety/depression  RN spoke with patient at length regarding circumstances that led to admission, current feelings, plans, etc   Pt reports asking staff for "information on coping skills" and reviewed this info with RN as well  Pt spoke at length about developing coping skills, being honest with people who are trying to help her and recognizing the need to take care of her mental health  Pt offers no physical complaints

## 2018-09-23 LAB
CHOLEST SERPL-MCNC: 153 MG/DL (ref 50–200)
HDLC SERPL-MCNC: 64 MG/DL (ref 40–60)
LDLC SERPL CALC-MCNC: 70 MG/DL (ref 0–100)
NONHDLC SERPL-MCNC: 89 MG/DL
TRIGL SERPL-MCNC: 94 MG/DL

## 2018-09-23 PROCEDURE — 80061 LIPID PANEL: CPT | Performed by: PSYCHIATRY & NEUROLOGY

## 2018-09-23 PROCEDURE — 99232 SBSQ HOSP IP/OBS MODERATE 35: CPT | Performed by: PSYCHIATRY & NEUROLOGY

## 2018-09-23 PROCEDURE — 86592 SYPHILIS TEST NON-TREP QUAL: CPT | Performed by: PSYCHIATRY & NEUROLOGY

## 2018-09-23 RX ADMIN — MIRTAZAPINE 15 MG: 15 TABLET, FILM COATED ORAL at 21:14

## 2018-09-23 RX ADMIN — NICOTINE 1 PATCH: 21 PATCH, EXTENDED RELEASE TRANSDERMAL at 09:07

## 2018-09-23 NOTE — MALNUTRITION/BMI
This medical record reflects one or more clinical indicators suggestive of malnutrition and/or morbid obesity  Malnutrition Findings:   Malnutrition type: Social/enviromental  Degree of Malnutrition: Malnutrition of moderate degree  Malnutrition Characteristics: Fat loss, Muscle loss, Inadequate energy, Weight loss (Loss of lean body mass: clavicle clearly visible, visible loss of muscle mass in upper body and torso  Loss of fat mass: orbitals are slightly dark and quite hollow     16 3% wt change over short period of time)Treating with supplements    BMI Findings: Body mass index is 18 34 kg/m²  See Nutrition note dated 09/23/2018 for additional details  Completed nutrition assessment is viewable in the nutrition documentation

## 2018-09-23 NOTE — PROGRESS NOTES
Pt appearing very social with peers on the unit  Had positive visit with male friend in visitation  Pt came to med room and expressed how well she was feeling  Is happy to be taking medication to gain weight  She said she lost a lot of weight and does appear quite thin for height  Denied all symptoms

## 2018-09-23 NOTE — PROGRESS NOTES
Patient asked staff to throw away her pack of [place] [employer] in her locker because she is going to quit smoking  Staff put cigarettes in Blanca's locker since he has been asking for some

## 2018-09-23 NOTE — PROGRESS NOTES
Social with peers  Tearful during interaction  Reports feeling quilt and shame due to recent OD  Difficult relationship with mother  Hopeful r/t relationship with spouse  Discussed coping skills  Looking forward to return to the [employer]  Compliant with medications and meals

## 2018-09-23 NOTE — PROGRESS NOTES
Progress Note - 20 [facility] 56 y o  female MRN: [id number]   Unit/Bed#: Beatrice Kumari 151-05 Encounter: [id number]    Behavior over the last 24 hours: improving  Dia Lemus continues to improve slowly, feels less depressed, less anxious and rates depression as 0 on a scale of 1 (best mood) to 10 (worst mood) today  She continues to deny suicidal thoughts today and states she is glad that she is alive  Has been taking medications  Attends groups  Sleep: improved  Appetite: improved  Medication side effects: No   ROS: no complaints, denies any headache, shortness of breath or abdominal pain    Mental Status Evaluation:    Appearance:  casually dressed   Behavior:  pleasant, cooperative   Speech:  normal rate and volume   Mood:  less anxious, less depressed   Affect:  brighter   Thought Process:  circumstantial   Associations: circumstantial associations   Thought Content:  no overt delusions   Perceptual Disturbances: no auditory hallucinations, no visual hallucinations   Risk Potential: Suicidal ideation - None at present, remorseful about suicide attempt  Homicidal ideation - None  Potential for aggression - No   Sensorium:  oriented to person, place and time/date   Memory:  recent and remote memory grossly intact   Consciousness:  alert and awake   Attention: attention span and concentration are improving   Insight:  moderate   Judgment: moderate   Gait/Station: normal gait/station and normal balance   Motor Activity: no abnormal movements     Vital signs in last 24 hours:    Temp:  [96 4 °F (35 8 °C)-97 2 °F (36 2 °C)] 97 2 °F (36 2 °C)  HR:  [52-58] 52  Resp:  [18] 18  BP: (125-137)/(62-69) 125/62    Laboratory results:  I have personally reviewed all pertinent laboratory/tests results      Lipid Profile:   Lab Results   Component Value Date    CHOLESTEROL 153 09/23/2018    HDL 64 (H) 09/23/2018    TRIG 94 09/23/2018    LDLCALC 70 09/23/2018    NONHDLC 89 09/23/2018       Progress Toward Goals: improving, less anxious, less depressed, not suicidal    Assessment/Plan   Principal Problem:    Severe episode of recurrent major depressive disorder, without psychotic features ([facility] [address] )  Active Problems:    Generalized anxiety disorder    Recommended Treatment:     Planned medication and treatment changes: All current active medications have been reviewed  Encourage group therapy, milieu therapy and occupational therapy  Behavioral Health checks every 5 minutes    Continue current medications:    Current Facility-Administered Medications:  acetaminophen 650 mg Oral Q6H PRN Taras Oppenheim, MD   acetaminophen 975 mg Oral Q6H PRN Taras Oppenheim, MD   aluminum-magnesium hydroxide-simethicone 30 mL Oral Q4H PRN Frankel Haylee, MD   benztropine 1 mg Intramuscular Q6H PRN Frankelbraeden Leach, MD   benztropine 1 mg Oral Q6H PRN Frankelbraeden Leach, MD   ibuprofen 600 mg Oral Q6H PRN Taras Oppenheim, MD   LORazepam 2 mg Intramuscular Q4H PRN Jostin Leach, MD   LORazepam 1 mg Oral Q4H PRN Jostin Leach, MD   magnesium hydroxide 30 mL Oral Daily PRN Jostin Leach MD   mirtazapine 15 mg Oral HS Elroy Ho   nicotine 1 patch Transdermal Daily Jostin Leach MD   OLANZapine 5 mg Oral Q3H PRN Jostin Leach MD   traZODone 50 mg Oral HS PRN Jostin Leach MD       Risks / Benefits of Treatment:    Risks, benefits, and possible side effects of medications explained to patient and patient verbalizes understanding and agreement for treatment  Counseling / Coordination of Care:    Patient's progress reviewed with nursing staff  Medications, treatment progress and treatment plan reviewed with patient      Rohith Loving MD 09/23/18

## 2018-09-24 LAB — RPR SER QL: NORMAL

## 2018-09-24 PROCEDURE — 99231 SBSQ HOSP IP/OBS SF/LOW 25: CPT | Performed by: PSYCHIATRY & NEUROLOGY

## 2018-09-24 RX ADMIN — MIRTAZAPINE 15 MG: 15 TABLET, FILM COATED ORAL at 21:15

## 2018-09-24 RX ADMIN — NICOTINE 1 PATCH: 21 PATCH, EXTENDED RELEASE TRANSDERMAL at 09:03

## 2018-09-24 NOTE — PROGRESS NOTES
Awake and alert early this AM  Frequently observed on phone and tearful at times during those interactions  Patient was found sitting in an empty patient room with another patient and she was crying and talking  When informed of rules and asked if she wanted to talk or discuss what was bothering her she reported "no", wiped the tears from her eyes and had a large grin on her face  Patient then preceded to discuss the inappropriate care she received while hospitalized  Negative during interaction  Complaints about agitated roommate, staff does not provide therapy, groups ave been effective since her admission  States she feels she is not allowed to have any emotions and is frequently wiping tears away and smiling a large grin  Attempted to discuss goals as a short term acute care hospital but remained negative and stated she could care for herself at home  Upset that she was not going to be home in time before her  left for work on Wednesday  Offered a room change to provide some relaxation, but patient refused  Offered quiet room and patient agreed, stating she needed time to pray and have time with her affirmations  Patient was later observed talking with peers  Agreed to room change

## 2018-09-24 NOTE — PLAN OF CARE
Problem: Ineffective Coping  Goal: Participates in unit activities  Interventions:  - Provide therapeutic environment   - Provide required programming   - Redirect inappropriate behaviors    Outcome: Progressing  Pt presented as pleasant and social   She attended a m  Groups and participated appropriately
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
## 2018-09-25 PROCEDURE — 99232 SBSQ HOSP IP/OBS MODERATE 35: CPT | Performed by: PSYCHIATRY & NEUROLOGY

## 2018-09-25 RX ADMIN — NICOTINE 1 PATCH: 21 PATCH, EXTENDED RELEASE TRANSDERMAL at 09:00

## 2018-09-25 RX ADMIN — MIRTAZAPINE 15 MG: 15 TABLET, FILM COATED ORAL at 21:09

## 2018-09-25 NOTE — CASE MANAGEMENT
CW contacted Pt's , Tres Chinchilla, @ [phone] to review discharge planned for tomorrow  CW reviewed Pt's follow-up appointments & that she has 15 days to get those needed items to [facility]  Tres Chinchilla agreed to provide transportation at 10:30 AM tomorrow

## 2018-09-25 NOTE — PROGRESS NOTES
Pt awake and alert, cooperative with staff  Pt denies SI/HI  Pt pleasant in conversation, tearful at times when discussing events leading to [facility] admission  Pt expressed eagerness to discharge "and begin working on myself"  RN encouraged continued communication with staff and peers and also provided with additional handouts for patient to read regarding depression, adapting to change and stress reduction  Visit with spouse this evening

## 2018-09-25 NOTE — DISCHARGE INSTR - APPOINTMENTS
Friday, September 28, 2018 at 9:00 AM: therapy appointment at [facility]    Monday, October 1, 2018 at 3:20 PM: medication management appointment with Dr Matteo Tracey at [facility] (40 minute appointment)

## 2018-09-25 NOTE — PROGRESS NOTES
Progress Note - Behavioral Health   Zari Corea 56 y o  female MRN: [id number]  Unit/Bed#: [facility] 253-01 Encounter: [id number]    Assessment/Plan   Principal Problem:    Severe episode of recurrent major depressive disorder, without psychotic features ([address] )  Active Problems:    Generalized anxiety disorder      Subjective:  Patient reports feeling better since admission  Is sleeping throughout the night  Appetite is increased  Appears more energetic, positive, future oriented, and denied SI  Anxiety reported as manageable by using coping skills  Is hyperverbal and circumstantial in thought process, but does not meet criteria for yudi  Medication compliant and tolerating Remeron well without side effects  Optimistic to stop smoking with patch  Tentative discharge tomorrow      Current Medications:  Current Facility-Administered Medications   Medication Dose Route Frequency    acetaminophen (TYLENOL) tablet 650 mg  650 mg Oral Q6H PRN    acetaminophen (TYLENOL) tablet 975 mg  975 mg Oral Q6H PRN    aluminum-magnesium hydroxide-simethicone (MYLANTA) 200-200-20 mg/5 mL oral suspension 30 mL  30 mL Oral Q4H PRN    benztropine (COGENTIN) injection 1 mg  1 mg Intramuscular Q6H PRN    benztropine (COGENTIN) tablet 1 mg  1 mg Oral Q6H PRN    ibuprofen (MOTRIN) tablet 600 mg  600 mg Oral Q6H PRN    LORazepam (ATIVAN) 2 mg/mL injection 2 mg  2 mg Intramuscular Q4H PRN    LORazepam (ATIVAN) tablet 1 mg  1 mg Oral Q4H PRN    magnesium hydroxide (MILK OF MAGNESIA) 400 mg/5 mL oral suspension 30 mL  30 mL Oral Daily PRN    mirtazapine (REMERON) tablet 15 mg  15 mg Oral HS    nicotine (NICODERM CQ) 21 mg/24 hr TD 24 hr patch 1 patch  1 patch Transdermal Daily    OLANZapine (ZyPREXA) tablet 5 mg  5 mg Oral Q3H PRN    traZODone (DESYREL) tablet 50 mg  50 mg Oral HS PRN       Behavioral Health Medications: all current active meds have been reviewed and continue current psychiatric medications  Vitals:  Vitals:    09/25/18 0751   BP: 99/59   Pulse: 61   Resp: 18   Temp: (!) 96 9 °F (36 1 °C)   SpO2:        Laboratory results:    I have personally reviewed all pertinent laboratory/tests results  Most Recent Labs:   Lab Results   Component Value Date    WBC 8 82 09/20/2018    RBC 4 90 09/20/2018    HGB 15 2 09/20/2018    HCT 45 2 09/20/2018     09/20/2018    RDW 12 8 09/20/2018     09/20/2018    K 4 0 09/20/2018     09/20/2018    CO2 24 09/20/2018    BUN 10 09/20/2018    CREATININE 0 81 09/20/2018    GLUC 98 09/20/2018    CALCIUM 9 6 09/20/2018    AST 20 09/20/2018    ALT 43 09/20/2018    ALKPHOS 65 09/20/2018    TP 7 3 09/20/2018    ALB 4 1 09/20/2018    TBILI 0 40 09/20/2018    CHOLESTEROL 153 09/23/2018    HDL 64 (H) 09/23/2018    TRIG 94 09/23/2018    LDLCALC 70 09/23/2018    NONHDLC 89 09/23/2018    RPR Non-Reactive 09/23/2018       Psychiatric Review of Systems:  Behavior over the last 24 hours:  improved  Sleep: normal  Appetite: normal  Medication side effects: No  ROS: no complaints    Mental Status Evaluation:  Appearance:  casually dressed   Behavior:  cooperative, pleasant   Speech:  normal pitch, normal volume and hyperverbal   Mood:  less anxious and depressed   Affect:  mood-congruent and brighter   Language appropriate   Thought Process:  circumstantial   Thought Content:  normal  Denied delusions/obsessions   Perceptual Disturbances: None   Risk Potential: Denied SI/HI   Potential for aggression: no   Sensorium:  person, place and time/date   Cognition:  grossly intact   Consciousness:  alert and awake    Recent and Remote Memory intact   Attention: attention span appeared shorter than expected for age   Insight:  fair   Judgment: fair   Gait/Station: normal gait/station and normal balance   Motor Activity: no abnormal movements     Progress Toward Goals: progressing    Recommended Treatment: Continue with group therapy, milieu therapy and occupational therapy  1   Continue current medications  2  Disposition planning with tentative discharge tomorrow     Risks, benefits and possible side effects of Medications:   Risks, benefits, and possible side effects of medications explained to patient and patient verbalizes understanding        Jasmyn Johnson PA-C

## 2018-09-25 NOTE — PLAN OF CARE
Problem: Ineffective Coping  Goal: Participates in unit activities  Interventions:  - Provide therapeutic environment   - Provide required programming   - Redirect inappropriate behaviors    Outcome: Progressing  Pt attended all therapeutic groups today  She presented as bright in affect and social with peers  Pt verbalized feeling ready for d/c tomorrow and hopes to be able to improve her relationship with her

## 2018-09-25 NOTE — PROGRESS NOTES
Patient is happy and upbeat, stating she is going home tomorrow  Denies all symptoms  Says she has a good support system that she has arranged to check on her frequently  Her  will also remain local for work  No questions or concerns

## 2018-09-25 NOTE — PROGRESS NOTES
Pt euphoric and excited during conversation with writer  Pt with bright affect and talking excitedly about being a non-smoker now  Pt spoke about this at length and her plans to continue with this upon discharge  Pt rambling and tangential  Pt denies SI  Stated she was hospitalized d/t anxiety but feels her anxiety is manageable now  No questions/concerns at this time

## 2018-09-25 NOTE — CASE MANAGEMENT
CW met with Pt to review planned discharge for tomorrow; Pt expressed relief  CW assisted Pt with contacting one of her employers, [employer], [facility]  Pt left a message stating she would discharge from the hospital tomorrow & contact her once she is home, or she could call back on CW's phone  CW contacted [employer] @ [phone] & spoke with Mumtaz Acosta, who confirmed Pt's therapy appointment  She said that the appointment with Dr Reji Terry would need to be changed to 3:20 PM, & it would be 40 mins long  CW contacted [employer] @ [phone] & spoke with Ms Manus Peabody  Pt was placed on the phone & it was initially reported she had only applied for SNAP benefits, & that application was pending  Pt reported she had checked off medical assistance too, & after reviewing the application, Ms Manus Peabody confirmed she had checked off [place] too  Ms Manus Peabody said she would be sending a notice to the [facility] regarding this  Pt asked about a check list of items that were due by today, and Ms Nye said that they could not reject an application until 30 days, & so Pt still had 15 more days to get those requested items submitted  Ms Manus Peabody said that if they don't receive the items within the next 15 days & they reject the application, Pt will have an additional 30 days after that to appeal it & get the items to them  Pt was happy to hear this & agreeable to follow-up with the requested items as soon as she gets home  CW spoke to Pt about her appointment time needed to be moved & she reported she usually starts work at 4 PM on Mondays, however, 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

## 2018-09-25 NOTE — CASE MANAGEMENT
CW observed Pt sitting in the quiet room, with the lights dimmed just staring at a wall  CW asked if she could talk to her & Pt agreed  CW reviewed scheduled appointments at [facility], & Pt kept repeating, "those are the appointments I scheduled, correct? 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  Pt said that she also reached out to her friend in [place], who has agreed to silas her everyday  Pt said that there is a woman named Hakeem Rendon, who comes to [facility] where she works, & befriended her  Pt said that she outreached to her too, and they are going to get together next week & do yoga  Pt was positive & smiling & future oriented  CW asked if Pt had outreached to Emily Barrera, her employer, & she said not yet  Pt became tearful stating she thinks she ruined her opportunity  Pt reported she is to be planning an event for Oct 6 & if she can't work on it, Emily Barrera will have to find someone else  CW offered to assist Pt with calling [place] from her cordless phone & she agreed  Pt called, however, reached voicemail  CW encouraged her to leave a message stating she will try to call her again tomorrow at 9 AM   After hanging up Pt became tearful stating she "did this, I messed things up even more for myself"  Pt focused on needing to leave the hospital to work & her  to return to work

## 2018-09-26 VITALS
RESPIRATION RATE: 15 BRPM | HEIGHT: 66 IN | BODY MASS INDEX: 18.26 KG/M2 | TEMPERATURE: 96.6 F | OXYGEN SATURATION: 94 % | WEIGHT: 113.6 LBS | DIASTOLIC BLOOD PRESSURE: 60 MMHG | HEART RATE: 82 BPM | SYSTOLIC BLOOD PRESSURE: 103 MMHG

## 2018-09-26 PROBLEM — E44.0 MODERATE PROTEIN-CALORIE MALNUTRITION (HCC): Status: ACTIVE | Noted: 2018-09-26

## 2018-09-26 PROCEDURE — 99239 HOSP IP/OBS DSCHRG MGMT >30: CPT | Performed by: PSYCHIATRY & NEUROLOGY

## 2018-09-26 RX ORDER — MIRTAZAPINE 15 MG/1
15 TABLET, FILM COATED ORAL
Qty: 30 TABLET | Refills: 1 | Status: SHIPPED | OUTPATIENT
Start: 2018-09-26 | End: 2018-09-26

## 2018-09-26 RX ORDER — NICOTINE 21 MG/24HR
1 PATCH, TRANSDERMAL 24 HOURS TRANSDERMAL DAILY
Qty: 28 PATCH | Refills: 1 | Status: SHIPPED | OUTPATIENT
Start: 2018-09-27 | End: 2021-08-03

## 2018-09-26 RX ORDER — MIRTAZAPINE 15 MG/1
15 TABLET, FILM COATED ORAL
Qty: 60 TABLET | Refills: 1 | Status: SHIPPED | OUTPATIENT
Start: 2018-09-26 | End: 2021-08-03

## 2018-09-26 RX ADMIN — NICOTINE 1 PATCH: 21 PATCH, EXTENDED RELEASE TRANSDERMAL at 08:29

## 2018-09-26 NOTE — DISCHARGE INSTRUCTIONS
Depression   WHAT YOU NEED TO KNOW:   Depression is a medical condition that causes feelings of sadness or hopelessness that do not go away  Depression may cause you to lose interest in things you used to enjoy  These feelings may interfere with your daily life  DISCHARGE INSTRUCTIONS:   Call 911 for any of the following:   · You think about harming yourself or someone else  Contact your healthcare provider if:   · Your symptoms do not improve  · You cannot make it to your next appointment  · You have new symptoms  · You have questions or concerns about your condition or care  Medicines:   · Antidepressants  may be given to improve or balance your mood  You may need to take this medicine for several weeks before you begin to feel better  · Take your medicine as directed  Contact your healthcare provider if you think your medicine is not helping or if you have side effects  Tell him of her if you are allergic to any medicine  Keep a list of the medicines, vitamins, and herbs you take  Include the amounts, and when and why you take them  Bring the list or the pill bottles to follow-up visits  Carry your medicine list with you in case of an emergency  Therapy  may be used to treat your depression  A therapist will help you learn to cope with your thoughts and feelings  This can be done alone or in a group  It may also be done with family members or a significant other  Self-care:   · Get regular physical activity  Try to exercise for 30 minutes, 3 to 5 days a week  Work with your healthcare provider to develop an exercise plan that you enjoy  Physical activity may improve your symptoms  · Get enough sleep  Create a routine to help you relax before bed  You can listen to music, read, or do yoga  Try to go to bed and wake up at the same time every day  Sleep is important for emotional health  · Eat a variety of healthy foods from all of the food groups    A healthy meal plan is low in fat, salt, and added sugar  Ask your healthcare provider for more information about a meal plan that is right for you  · Do not drink alcohol or use drugs  Alcohol and drugs can make your symptoms worse  Follow up with your healthcare provider as directed: Your healthcare provider will monitor your progress at follow-up visits  He or she will also monitor your medicine if you take antidepressants  Your healthcare provider will ask if the medicine is helping  Tell him or her about any side effects or problems you may have with your medicine  The type or amount of medicine may need to be changed  Write down your questions so you remember to ask them during your visits  © 2017 [employer] Information is for End User's use only and may not be sold, redistributed or otherwise used for commercial purposes  All illustrations and images included in CareNotes® are the copyrighted property of [employer]  or Adi Boyer  The above information is an  only  It is not intended as medical advice for individual conditions or treatments  Talk to your doctor, nurse or pharmacist before following any medical regimen to see if it is safe and effective for you

## 2018-09-26 NOTE — CASE MANAGEMENT
CW met with Pt who verbalized readiness for discharge  Pt's employer, [employer], had called back late yesterday afternoon, & left a voicemail that Pt could just call her when she gets home today  Pt expressed relieve that [employer]hen[employer] had gotten her messages & called back  CW & Pt reviewed her follow-up appointments & she had no questions  Pt plans to make some phone calls to her employers when she gets home & then relax with her  & their dog  Pt had no questions about her discharge

## 2018-09-26 NOTE — DISCHARGE INSTR - LAB
Contact Information: If you have any questions, concerns, pended studies, tests and/or procedures, or emergencies regarding your inpatient behavioral health visit  Please contact [place] behavioral [facility] unit [phone] and ask to speak to a , nurse or physician  A contact is available 24 hours/ 7 days a week at this number  Summary of Procedures Performed During your Stay:  Below is a list of major procedures performed during your hospital stay and a summary of results:  - No major procedures performed  Pending Studies     None        If studies are pending at discharge, follow up with your PCP and/or referring provider

## 2018-09-26 NOTE — DISCHARGE SUMMARY
Discharge Summary - [facility] 56 y o  female MRN: [id number]  Unit/Bed#: [facility] 263-01 Encounter: [id number]     Admission Date: 9/21/18        Discharge Date:  9/26/18    Attending Psychiatrist:  Inna Ventura MD,  Mike Cavazos III, DO    Reason for Admission/HPI:   History of Present Illness   Patient is a 56year old female who presented to [facility] ED by EMS due to suicide attempt by overdose of 8 50mg Trazodone tablets  Patient then drove to abandoned parking lot, regretted decision, and called 911  Precipitating events are moving to a new area, unemployment, financially struggling, having feeling of isolation, and relationship issues with  in which he filed for divorce  Patient reported over the last few months increased depression with symptoms of poor sleep, lack of appetite, lack of energy, lack of motivation, anhedonia, hopelessness, and guilt  Patient also reported increased anxiety with panic attacks  She denied psychosis and did not endorse criteria for yudi  Patient denied prior inpatient psychiatric hospitalizations, suicide attempts, and is in treatment at [facility]  Psychosocial Stressors: relationship, location, occupation, finances      Hospital Course:   Behavioral Health Medications:   current meds:   Current Facility-Administered Medications   Medication Dose Route Frequency    acetaminophen (TYLENOL) tablet 650 mg  650 mg Oral Q6H PRN    acetaminophen (TYLENOL) tablet 975 mg  975 mg Oral Q6H PRN    aluminum-magnesium hydroxide-simethicone (MYLANTA) 200-200-20 mg/5 mL oral suspension 30 mL  30 mL Oral Q4H PRN    benztropine (COGENTIN) injection 1 mg  1 mg Intramuscular Q6H PRN    benztropine (COGENTIN) tablet 1 mg  1 mg Oral Q6H PRN    ibuprofen (MOTRIN) tablet 600 mg  600 mg Oral Q6H PRN    LORazepam (ATIVAN) 2 mg/mL injection 2 mg  2 mg Intramuscular Q4H PRN    LORazepam (ATIVAN) tablet 1 mg  1 mg Oral Q4H PRN    magnesium hydroxide (MILK OF MAGNESIA) 400 mg/5 mL oral suspension 30 mL  30 mL Oral Daily PRN    mirtazapine (REMERON) tablet 15 mg  15 mg Oral HS    nicotine (NICODERM CQ) 21 mg/24 hr TD 24 hr patch 1 patch  1 patch Transdermal Daily    OLANZapine (ZyPREXA) tablet 5 mg  5 mg Oral Q3H PRN    traZODone (DESYREL) tablet 50 mg  50 mg Oral HS PRN       Patient was admitted to [facility] In[facility] on voluntary 201 commitment for safety and stabilization  On admission patient was placed on Remeron 15mg HS for insomnia/poor appetite/depression management  Repeat EKG showed to be within normal limits  She did not require titration of medication or PRN psychiatric medications  She tolerated medications with no acute side effects  Her mood brightened over the course of her treatment, and she was seen in [facility] interacting appropriately with peers  She did not demonstrate dangerous behavior to self or others during her inpatient stay  On day of discharge patient had reduced depression, controllable anxiety, denied psychosis, did not endorse criteria for yudi, and denied suicidal/homicidal ideations  Mental Status at time of Discharge:     Appearance:  casually dressed   Behavior:  cooperative   Speech:  normal pitch and normal volume   Mood:  euthymic   Affect:  mood-congruent   Thought Process:  circumstantial   Thought Content:  normal   Perceptual Disturbances: None   Risk Potential: Denied SI/HI   Potential for aggression: No   Sensorium:  person, place and time/date   Cognition:  grossly intact   Consciousness:  alert and awake    Attention: attention span and concentration were age appropriate   Insight:  fair   Judgment: fair   Gait/Station: normal gait/station and normal balance   Motor Activity: no abnormal movements       Discharge Diagnosis:   Severe episode of recurrent major depressive disorder, without psychotic features   Generalized anxiety disorder      Discharge Medications:  See after visit summary for reconciled discharge medications provided to patient and family  Discharge instructions/Information to patient and family:   See after visit summary for information provided to patient and family  Provisions for Follow-Up Care:  See after visit summary for information related to follow-up care and any pertinent home health orders  Discharge Statement   I spent 34 minutes discharging the patient  This time was spent on the day of discharge  I had direct contact with the patient on the day of discharge  On day of discharge patient had mental status exam performed, discharge instructions/medications reviewed, and outpatient planning discussed  She was given 1 month plus 1 refill of scripts  She accepted tobacco cessation therapy      Carter More PA-C

## 2018-09-26 NOTE — NURSING NOTE
Discharge instructions reviewed with pt  Pt verbalized understanding  Pt offered no questions/concerns  Pt escorted off unit via MHT and discharged in NAD with

## 2019-09-03 ENCOUNTER — OFFICE VISIT (OUTPATIENT)
Dept: OBGYN CLINIC | Facility: CLINIC | Age: 59
End: 2019-09-03
Payer: COMMERCIAL

## 2019-09-03 VITALS
DIASTOLIC BLOOD PRESSURE: 70 MMHG | WEIGHT: 113 LBS | HEIGHT: 66 IN | SYSTOLIC BLOOD PRESSURE: 122 MMHG | BODY MASS INDEX: 18.16 KG/M2

## 2019-09-03 DIAGNOSIS — M25.512 ACUTE PAIN OF LEFT SHOULDER: Primary | ICD-10-CM

## 2019-09-03 DIAGNOSIS — M75.42 IMPINGEMENT SYNDROME OF LEFT SHOULDER: ICD-10-CM

## 2019-09-03 PROCEDURE — 99204 OFFICE O/P NEW MOD 45 MIN: CPT | Performed by: FAMILY MEDICINE

## 2019-09-03 NOTE — ASSESSMENT & PLAN NOTE
Left shoulder pain following a falling injury off of the patient's home weight machine at which time she attempted to grab the railing break her fall  Injuries worrisome for potential proximal humeral head fracture and or rotator cuff tear  Will recommend to continue with a shoulder sling immobilization, anti-inflammatories and ice  Recommend MRI arthrogram of left shoulder at this time to rule out labrum tear versus rotator cuff tear and fracture  Recommend no work at this time until further notice  I will see the patient back once her MRI is completed

## 2019-09-03 NOTE — PROGRESS NOTES
Assessment:     1  Acute pain of left shoulder    2  Impingement syndrome of left shoulder        Plan:     Problem List Items Addressed This Visit        Other    Acute pain of left shoulder - Primary    Relevant Orders    MRI arthrogram left shoulder    FL injection left shoulder (arthrogram)    Impingement syndrome of left shoulder    Relevant Orders    MRI arthrogram left shoulder    FL injection left shoulder (arthrogram)         Subjective:     Patient ID: Wolfgang Cameron is a 66 y o  female  Chief Complaint:  Patient is a 61-year-old female presenting today for evaluation of left shoulder pain  She reports 2 weeks ago while exercising on her weight machine, sliding down a weight machine and as she attempted to gravity railing at the side of the machine, experiencing a popping sensation in the left shoulder followed by immediate onset pain  She has continue with pain and swelling in the left shoulder since the time of her injury  Pain is a throbbing, achy pain along the anterior aspect of the shoulder that radiates down the left arm  Pain is reproduced when she attempts to move her arm  She states that she is unable to raise arm up forward or up to her head  She also reports noticing swelling and bruising along the middle portion of her left arm days after the injury  She was seen at the urgent care center and provided with a shoulder sling which she has been wearing throughout the day  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 her left arm  Ice and anti-inflammatories provided minimal relief      Allergy:  No Known Allergies  Medications:  all current active meds have been reviewed  Past Medical History:  Past Medical History:   Diagnosis Date    Depression     Psychiatric disorder      Past Surgical History:  Past Surgical History:   Procedure Laterality Date    HYSTERECTOMY       Family History:  Family History   Problem Relation Age of Onset    Cancer Father     Cancer Sister      Social History:  Social History     Substance and Sexual Activity   Alcohol Use Yes    Alcohol/week: 1 0 standard drinks    Types: 1 Glasses of wine per week    Comment: "sometimes"     Social History     Substance and Sexual Activity   Drug Use Yes    Types: Marijuana     Social History     Tobacco Use   Smoking Status Current Every Day Smoker    Packs/day: 0 50   Smokeless Tobacco Never Used     Review of Systems   Constitutional: Negative  HENT: Negative  Eyes: Negative  Respiratory: Negative  Cardiovascular: Negative  Gastrointestinal: Negative  Genitourinary: Negative  Musculoskeletal: Positive for arthralgias and myalgias  Skin: Negative  Allergic/Immunologic: Negative  Neurological: Negative  Hematological: Negative  Psychiatric/Behavioral: Negative  Objective:  BP Readings from Last 1 Encounters:   09/03/19 122/70      Wt Readings from Last 1 Encounters:   09/03/19 51 3 kg (113 lb)      BMI:   Estimated body mass index is 18 24 kg/m² as calculated from the following:    Height as of this encounter: 5' 6" (1 676 m)  Weight as of this encounter: 51 3 kg (113 lb)  BSA:   Estimated body surface area is 1 57 meters squared as calculated from the following:    Height as of this encounter: 5' 6" (1 676 m)  Weight as of this encounter: 51 3 kg (113 lb)  Physical Exam   Constitutional: She is oriented to person, place, and time  Vital signs are normal  She appears well-developed  HENT:   Head: Normocephalic  Eyes: Pupils are equal, round, and reactive to light  Pulmonary/Chest: Effort normal    Neurological: She is alert and oriented to person, place, and time  Skin: Skin is warm and dry  Psychiatric: She has a normal mood and affect  Nursing note and vitals reviewed  Right Shoulder Exam   Right shoulder exam is normal     Tenderness   The patient is experiencing no tenderness      Range of Motion The patient has normal right shoulder ROM  Left Shoulder Exam     Tenderness   The patient is experiencing tenderness in the acromioclavicular joint, clavicle, acromion and biceps tendon  Range of Motion   Active abduction: abnormal   Passive abduction: abnormal   Extension: abnormal   External rotation: abnormal   Forward flexion: abnormal   Internal rotation 0 degrees: abnormal   Internal rotation 90 degrees: abnormal     Tests   Apprehension: positive  Kowalski test: positive  Cross arm: positive  Impingement: positive    Other   Erythema: absent  Sensation: normal  Pulse: present             I have personally reviewed pertinent films in PACS     Lucency changes along the proximal aspect of the humeral head concerning for humeral head fracture

## 2019-09-03 NOTE — LETTER
September 3, 2019     Patient: Jalen Adrian   YOB: 1960   Date of Visit: 9/3/2019       To Whom it May Concern:    Rommel Bradley is under my professional care  She was seen in my office on 9/3/2019  She should be withheld from work at this time until further notice  If you have any questions or concerns, please don't hesitate to call           Sincerely,          Darius Vogt,         CC: No Recipients

## 2019-09-10 ENCOUNTER — TELEPHONE (OUTPATIENT)
Dept: OBGYN CLINIC | Facility: CLINIC | Age: 59
End: 2019-09-10

## 2019-09-10 ENCOUNTER — HOSPITAL ENCOUNTER (OUTPATIENT)
Dept: RADIOLOGY | Facility: HOSPITAL | Age: 59
Discharge: HOME | End: 2019-09-10
Attending: FAMILY MEDICINE

## 2019-09-11 ENCOUNTER — HOSPITAL ENCOUNTER (OUTPATIENT)
Dept: RADIOLOGY | Facility: HOSPITAL | Age: 59
Discharge: HOME | End: 2019-09-11
Attending: FAMILY MEDICINE
Payer: COMMERCIAL

## 2019-09-11 ENCOUNTER — HOSPITAL ENCOUNTER (OUTPATIENT)
Dept: MRI IMAGING | Facility: HOSPITAL | Age: 59
Discharge: HOME | End: 2019-09-11
Attending: FAMILY MEDICINE
Payer: COMMERCIAL

## 2019-09-11 DIAGNOSIS — M75.42 IMPINGEMENT SYNDROME OF LEFT SHOULDER: ICD-10-CM

## 2019-09-11 DIAGNOSIS — M25.512 ACUTE PAIN OF LEFT SHOULDER: ICD-10-CM

## 2019-09-11 PROCEDURE — A9585 GADOBUTROL INJECTION: HCPCS | Performed by: RADIOLOGY

## 2019-09-11 PROCEDURE — 77002 NEEDLE LOCALIZATION BY XRAY: CPT

## 2019-09-11 PROCEDURE — 73222 MRI JOINT UPR EXTREM W/DYE: CPT

## 2019-09-11 PROCEDURE — 23350 INJECTION FOR SHOULDER X-RAY: CPT

## 2019-09-11 RX ORDER — 0.9 % SODIUM CHLORIDE 0.9 %
50 VIAL (ML) INJECTION
Status: DISCONTINUED | OUTPATIENT
Start: 2019-09-11 | End: 2019-09-12 | Stop reason: HOSPADM

## 2019-09-11 RX ORDER — LIDOCAINE HYDROCHLORIDE 10 MG/ML
35 INJECTION, SOLUTION INFILTRATION; PERINEURAL
Status: DISCONTINUED | OUTPATIENT
Start: 2019-09-11 | End: 2019-09-12 | Stop reason: HOSPADM

## 2019-09-11 RX ADMIN — GADOBUTROL 2 ML: 604.72 INJECTION INTRAVENOUS at 16:00

## 2019-09-11 RX ADMIN — IOHEXOL 50 ML: 300 INJECTION, SOLUTION INTRAVENOUS at 14:10

## 2019-09-12 ENCOUNTER — OFFICE VISIT (OUTPATIENT)
Dept: OBGYN CLINIC | Facility: CLINIC | Age: 59
End: 2019-09-12
Payer: COMMERCIAL

## 2019-09-12 VITALS
DIASTOLIC BLOOD PRESSURE: 70 MMHG | HEIGHT: 66 IN | WEIGHT: 113 LBS | SYSTOLIC BLOOD PRESSURE: 128 MMHG | BODY MASS INDEX: 18.16 KG/M2

## 2019-09-12 DIAGNOSIS — M25.512 ACUTE PAIN OF LEFT SHOULDER: Primary | ICD-10-CM

## 2019-09-12 DIAGNOSIS — M75.42 IMPINGEMENT SYNDROME OF LEFT SHOULDER: ICD-10-CM

## 2019-09-12 PROBLEM — R07.81 RIB PAIN ON LEFT SIDE: Status: ACTIVE | Noted: 2019-09-12

## 2019-09-12 PROCEDURE — 99214 OFFICE O/P EST MOD 30 MIN: CPT | Performed by: FAMILY MEDICINE

## 2019-09-12 NOTE — LETTER
September 12, 2019     Patient: Bud Pierre   YOB: 1960   Date of Visit: 9/12/2019       To Whom it May Concern:    Bud Pierre is under my professional care  She was seen in my office on 9/12/2019  She she not return to work at this time until further notice  If you have any questions or concerns, please don't hesitate to call           Sincerely,          Shalini Price DO        CC: No Recipients

## 2019-09-12 NOTE — PROGRESS NOTES
Assessment:     1  Acute pain of left shoulder    2  Impingement syndrome of left shoulder        Plan:     Problem List Items Addressed This Visit        Other    Acute pain of left shoulder - Primary     MRI results have been reviewed and discussed with the patient  Recommend continue with shoulder sling immobilization at this time  I will see the patient back for follow-up in 2 weeks for repeat x-rays of the left shoulder  Relevant Medications    diclofenac sodium (VOLTAREN) 1 %    Impingement syndrome of left shoulder    Relevant Medications    diclofenac sodium (VOLTAREN) 1 %         Subjective:     Patient ID: Rosmery Dinh is a 66 y o  female  Chief Complaint:  Patient presents today for follow-up of left shoulder pain and MRI results  She reports 2 weeks ago while exercising on her weight machine, sliding down a weight machine and as she attempted to gravity railing at the side of the machine, experiencing a popping sensation in the left shoulder followed by immediate onset pain  She has continue with pain and swelling in the left shoulder since the time of her injury  Pain is a throbbing, achy pain along the anterior aspect of the shoulder that radiates down the left arm  Pain is reproduced when she attempts to move her arm  She states that she is unable to raise arm up forward or up to her head  She also reports noticing swelling and bruising along the middle portion of her left arm days after the injury  She was seen at the urgent care center and provided with a shoulder sling which she has been wearing throughout the day  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 her left arm  Ice and anti-inflammatories provided minimal relief      Allergy:  No Known Allergies  Medications:  all current active meds have been reviewed  Past Medical History:  Past Medical History:   Diagnosis Date    Depression     Psychiatric disorder      Past Surgical History:  Past Surgical History:   Procedure Laterality Date    FL INJECTION LEFT SHOULDER (ARTHROGRAM)  9/11/2019    HYSTERECTOMY       Family History:  Family History   Problem Relation Age of Onset    Cancer Father     Cancer Sister      Social History:  Social History     Substance and Sexual Activity   Alcohol Use Yes    Alcohol/week: 1 0 standard drinks    Types: 1 Glasses of wine per week    Comment: "sometimes"     Social History     Substance and Sexual Activity   Drug Use Yes    Types: Marijuana     Social History     Tobacco Use   Smoking Status Current Every Day Smoker    Packs/day: 0 50   Smokeless Tobacco Never Used     Review of Systems   Constitutional: Negative  HENT: Negative  Eyes: Negative  Respiratory: Negative  Cardiovascular: Negative  Gastrointestinal: Negative  Genitourinary: Negative  Musculoskeletal: Positive for arthralgias and myalgias  Skin: Negative  Allergic/Immunologic: Negative  Neurological: Negative  Hematological: Negative  Psychiatric/Behavioral: Negative  Objective:  BP Readings from Last 1 Encounters:   09/12/19 128/70      Wt Readings from Last 1 Encounters:   09/12/19 51 3 kg (113 lb)      BMI:   Estimated body mass index is 18 24 kg/m² as calculated from the following:    Height as of this encounter: 5' 6" (1 676 m)  Weight as of this encounter: 51 3 kg (113 lb)  BSA:   Estimated body surface area is 1 57 meters squared as calculated from the following:    Height as of this encounter: 5' 6" (1 676 m)  Weight as of this encounter: 51 3 kg (113 lb)  Physical Exam   Constitutional: She is oriented to person, place, and time  Vital signs are normal  She appears well-developed  HENT:   Head: Normocephalic  Eyes: Pupils are equal, round, and reactive to light  Pulmonary/Chest: Effort normal    Neurological: She is alert and oriented to person, place, and time  Skin: Skin is warm and dry     Psychiatric: She has a normal mood and affect  Nursing note and vitals reviewed  Right Shoulder Exam   Right shoulder exam is normal     Tenderness   The patient is experiencing no tenderness  Range of Motion   The patient has normal right shoulder ROM  Left Shoulder Exam     Tenderness   The patient is experiencing tenderness in the acromioclavicular joint, clavicle, acromion and biceps tendon  Range of Motion   Active abduction: abnormal   Passive abduction: abnormal   Extension: abnormal   External rotation: abnormal   Forward flexion: abnormal   Internal rotation 0 degrees: abnormal   Internal rotation 90 degrees: abnormal     Tests   Apprehension: positive  Kowalski test: positive  Cross arm: positive  Impingement: positive    Other   Erythema: absent  Sensation: normal  Pulse: present             I have personally reviewed pertinent films in PACS  Subacute, nondisplaced greater tuberosity fracture through the proximal humerus  There is also some noted contrast extravasation from the inferior aspect of the joint capsule she is a nonspecific finding but all also could indicate a glenohumeral ligament complex injury

## 2019-09-12 NOTE — ASSESSMENT & PLAN NOTE
MRI results have been reviewed and discussed with the patient  Recommend continue with shoulder sling immobilization at this time  I will see the patient back for follow-up in 2 weeks for repeat x-rays of the left shoulder

## 2019-09-12 NOTE — ASSESSMENT & PLAN NOTE
Pain along the anterior aspect of the left ribcage consistent with rib contusion  Patient may use Voltaren gel over this affected area  Injuries of this nature can persist for periods of 4-6 weeks

## 2019-09-26 ENCOUNTER — APPOINTMENT (OUTPATIENT)
Dept: RADIOLOGY | Facility: CLINIC | Age: 59
End: 2019-09-26
Payer: COMMERCIAL

## 2019-09-26 ENCOUNTER — OFFICE VISIT (OUTPATIENT)
Dept: OBGYN CLINIC | Facility: CLINIC | Age: 59
End: 2019-09-26
Payer: COMMERCIAL

## 2019-09-26 VITALS
HEIGHT: 66 IN | SYSTOLIC BLOOD PRESSURE: 130 MMHG | BODY MASS INDEX: 18.16 KG/M2 | WEIGHT: 113 LBS | DIASTOLIC BLOOD PRESSURE: 70 MMHG

## 2019-09-26 DIAGNOSIS — M25.512 LEFT SHOULDER PAIN, UNSPECIFIED CHRONICITY: Primary | ICD-10-CM

## 2019-09-26 DIAGNOSIS — M25.512 LEFT SHOULDER PAIN, UNSPECIFIED CHRONICITY: ICD-10-CM

## 2019-09-26 DIAGNOSIS — M75.42 IMPINGEMENT SYNDROME OF LEFT SHOULDER: ICD-10-CM

## 2019-09-26 PROCEDURE — 99213 OFFICE O/P EST LOW 20 MIN: CPT | Performed by: FAMILY MEDICINE

## 2019-09-26 PROCEDURE — 73030 X-RAY EXAM OF SHOULDER: CPT

## 2019-09-26 NOTE — LETTER
September 26, 2019     Patient: Virginia Simms   YOB: 1960   Date of Visit: 9/26/2019       To Whom it May Concern:    Virginia Simms is under my professional care  She was seen in my office on 9/26/2019  She may return to work on September 30, 2019       If you have any questions or concerns, please don't hesitate to call           Sincerely,          Dnaielle Dugan DO        CC: Virginia Simms

## 2019-09-26 NOTE — PROGRESS NOTES
Assessment:     1  Left shoulder pain, unspecified chronicity    2  Impingement syndrome of left shoulder        Plan:     Problem List Items Addressed This Visit        Other    Impingement syndrome of left shoulder    Relevant Orders    Ambulatory referral to Physical Therapy      Other Visit Diagnoses     Left shoulder pain, unspecified chronicity    -  Primary    Relevant Orders    XR shoulder 2+ vw left    Ambulatory referral to Physical Therapy         Subjective:     Patient ID: Nikole Ospina is a 66 y o  female  Chief Complaint:  Patient reports good overall improvement of shoulder pain symptoms  She does still struggle to bring her arm up above her head while otherwise pain is well controlled  Allergy:  No Known Allergies  Medications:  all current active meds have been reviewed  Past Medical History:  Past Medical History:   Diagnosis Date    Depression     Psychiatric disorder      Past Surgical History:  Past Surgical History:   Procedure Laterality Date    FL INJECTION LEFT SHOULDER (ARTHROGRAM)  9/11/2019    HYSTERECTOMY       Family History:  Family History   Problem Relation Age of Onset    Cancer Father     Cancer Sister      Social History:  Social History     Substance and Sexual Activity   Alcohol Use Yes    Alcohol/week: 1 0 standard drinks    Types: 1 Glasses of wine per week    Comment: "sometimes"     Social History     Substance and Sexual Activity   Drug Use Yes    Types: Marijuana     Social History     Tobacco Use   Smoking Status Current Every Day Smoker    Packs/day: 0 50   Smokeless Tobacco Never Used     Review of Systems   Constitutional: Negative  HENT: Negative  Eyes: Negative  Respiratory: Negative  Cardiovascular: Negative  Gastrointestinal: Negative  Genitourinary: Negative  Musculoskeletal: Positive for arthralgias and myalgias  Skin: Negative  Allergic/Immunologic: Negative  Neurological: Negative  Hematological: Negative  Psychiatric/Behavioral: Negative  Objective:  BP Readings from Last 1 Encounters:   09/26/19 130/70      Wt Readings from Last 1 Encounters:   09/26/19 51 3 kg (113 lb)      BMI:   Estimated body mass index is 18 24 kg/m² as calculated from the following:    Height as of this encounter: 5' 6" (1 676 m)  Weight as of this encounter: 51 3 kg (113 lb)  BSA:   Estimated body surface area is 1 57 meters squared as calculated from the following:    Height as of this encounter: 5' 6" (1 676 m)  Weight as of this encounter: 51 3 kg (113 lb)  Physical Exam   Constitutional: She is oriented to person, place, and time  Vital signs are normal  She appears well-developed  HENT:   Head: Normocephalic  Eyes: Pupils are equal, round, and reactive to light  Pulmonary/Chest: Effort normal    Musculoskeletal: Normal range of motion  Neurological: She is alert and oriented to person, place, and time  Skin: Skin is warm and dry  Psychiatric: She has a normal mood and affect  Nursing note and vitals reviewed  Right Shoulder Exam   Right shoulder exam is normal     Tenderness   The patient is experiencing no tenderness  Range of Motion   The patient has normal right shoulder ROM  Left Shoulder Exam     Tenderness   The patient is experiencing tenderness in the acromioclavicular joint, clavicle, acromion and biceps tendon  Tests   Apprehension: positive  Kowalski test: positive  Cross arm: positive  Impingement: positive    Other   Erythema: absent  Sensation: normal  Pulse: present             I have personally reviewed pertinent films in PACS  Appropriate callus formation and healing

## 2019-09-26 NOTE — ASSESSMENT & PLAN NOTE
X-ray today demonstrates appropriate callus formation healing  Will recommend beginning physical therapy at this time for shoulder strengthening and range of motion  Patient may return to work effective September 30, 2019  I will see the patient back for follow-up in 1 week

## 2019-10-03 ENCOUNTER — EVALUATION (OUTPATIENT)
Dept: PHYSICAL THERAPY | Facility: CLINIC | Age: 59
End: 2019-10-03
Payer: COMMERCIAL

## 2019-10-03 DIAGNOSIS — M25.512 LEFT SHOULDER PAIN, UNSPECIFIED CHRONICITY: ICD-10-CM

## 2019-10-03 DIAGNOSIS — M75.42 IMPINGEMENT SYNDROME OF LEFT SHOULDER: ICD-10-CM

## 2019-10-03 PROCEDURE — 97162 PT EVAL MOD COMPLEX 30 MIN: CPT | Performed by: PHYSICAL THERAPIST

## 2019-10-03 NOTE — PROGRESS NOTES
PT Evaluation     Today's date: 10/3/2019  Patient name: Brigette Zuleta  : 1960  MRN: [id number]  Referring provider: Jame Lucero DO  Dx:   Encounter Diagnosis     ICD-10-CM    1  Left shoulder pain, unspecified chronicity M25 512 Ambulatory referral to Physical Therapy   2  Impingement syndrome of left shoulder M75 42 Ambulatory referral to Physical Therapy                  Assessment  Assessment details: Brigette Zuleta is a 66 y o  female presenting as an outpatient to [place] [address] PT w/ c/o/dx  of Left shoulder pain s/p nondisplaced greater tuberosity fracture through the proximal humerus as a result of fall form [employer] on 19  Pt presents w/ pain, decreased ROM, decreased strength, impaired posture, and hypertonicity of surrounding musculature significantly limiting pt's functional ability  Pt will benefit from skilled PT services to address the above deficits in order to max function to allow pt to achieve goals in PT  Thank you for the referral of this pt  Impairments: abnormal gait, abnormal muscle tone, abnormal or restricted ROM, activity intolerance, impaired balance, impaired physical strength, lacks appropriate home exercise program, pain with function, safety issue, scapular dyskinesis and weight-bearing intolerance  Understanding of Dx/Px/POC: good   Prognosis: fair    Goals  ST  Pain decreased by 25% in 4-6 weeks  2  ROM increased by 25% in 4-6 weeks  3  Strength increased by 1/2 to 1 muscle grade in all deficient muscle groups in 4-6 weeks  LT  Decrease pain to 1-2/10 at worst by d/c   2  Increase ROM to [facility] for all deficient movements by d/c   3  Strength increased to 5 for all deficient muscle groups by d/c   4  IADL performance increased to max function by d/c   5  Recreational performance increased to max function by d/c      Plan  Planned modality interventions: cryotherapy and TENS  Other planned modality interventions: other modalities PRN  Planned therapy interventions: ADL retraining, IADL retraining, flexibility, functional ROM exercises, graded exercise, home exercise program, manual therapy, joint mobilization, neuromuscular re-education, patient education, postural training, strengthening, therapeutic exercise and therapeutic activities  Other planned therapy interventions: other interventions PRN  Frequency: 1-2x/week  Duration in weeks: 6  Plan of Care beginning date: 10/3/2019  Plan of Care expiration date: 2019  Treatment plan discussed with: patient        Subjective Evaluation    History of Present Illness  Date of onset: 2019  Mechanism of injury: Pt reports to IE s/p nondisplaced greater tuberosity fracture through the proximal humerus and L shoulder pain which began as a result of fall from [employer] while working out  Pt reports falling onto L side causing "popping" of L shoulder  Pt reports going to urgent care that day  She reports having an x-ray taken and was given a sling to wear  However, she went to see an orthopedic on 9/3/19 who dx'd her w/ fracture  Pt reports intermittent parasthesias into LUE, but unsure of any specific activities which trigger parasthesias  Pt reports that she also injured L rib cage during fall, but this has been improving w/ use of prescription anti-inflammatory cream   She adds that L wrist has been causing some pain recently- discussed that pt should inform ortho if symptoms do not improve  Pain  Current pain ratin  At worst pain ratin  Location: L shoulder  Relieving factors: ice, medications, rest and support (Aleve)  Exacerbated by: L shoulder elevation, lifting, sleeping (improved w/ pillow support under LUE), functional IR, functional ER, reaching away from body  Social Support  Patient lives at: [place]  Lives with: roommate  Employment status: working (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)  Hand dominance: right      Diagnostic Tests  Abnormal x-ray: 926:nondisplaced fracture    Abnormal MRI: 9/11: possible inferior glenohumeral labroligamentous complex injury; nondisplaced greater tuberosity fracture of the proximal humerus   Patient Goals  Patient goals for therapy: decreased pain and increased motion          Objective     Active Range of Motion   Left Shoulder   Flexion: 36 degrees with pain  Abduction: 55 degrees with pain  External rotation 45°: 48 degrees with pain  Internal rotation 45°: 44 degrees with pain    Additional Active Range of Motion Details  Gross c/s ROM (R/L):   flexion: 25%  extension: 50%  rotation: 75%/75%  lateral flexion: 50%/50%      Passive Range of Motion   Left Shoulder   Flexion: 29 degrees with pain  Abduction: 80 degrees with pain  External rotation 45°: 57 degrees with pain  Internal rotation 45°: 44 degrees with pain    Strength/Myotome Testing     Left Shoulder     Planes of Motion   Flexion: 4 (*pain)   Abduction: 4 (*pain)   External rotation at 45°: 4 (*pain)   Internal rotation at 45°: 4 (*pain)     General Comments:      Shoulder Comments   Observation/Posture:  Pt sits w/ fwd, rounded shoulders w/ fwd head    Palpation: Tenderness w/ palpation to lateral shoulder, anterior shoulder, and clavicle; hypertonicity/tenderness w/ palpation to biceps muscle belly    c/s special tests:   sharp-jc: negative  vertebral artery: negative  spurlings:negative        Flowsheet Rows      Most Recent Value   PT/OT G-Codes   Current Score  34   Projected Score  65              Daily Treatment Diary    EPOC: 11/14/19  Precautions: Currently has subacute non-displaced greater tuberosity fracture of L proximal humerus  Co- Morbidities: Pt reports depression is being successfully managed- denies any suicidal thoughts/behaviors at this time  Patient Active Problem List   Diagnosis    Suicidal ideations    Severe episode of recurrent major depressive disorder, without psychotic features ([address] )    Generalized anxiety disorder    Moderate protein-calorie malnutrition (HCC)    Acute pain of left shoulder    Impingement syndrome of left shoulder    Rib pain on left side       Manual  10/3                   L shoulder GHJ mobs            L Shoulder PROM                      TPR/STM                                                                                          Exercise Diary  10/3                 HEP instruct/handout 5'          Pendulums   m/l &a/p                   UT stretch                   Post sh rolls                   Scap Retraction                   Supine AA sh flexion                   Supine AA sh ER                   Standing cane ext/IR                                                                                                                                                                                                                                                                                        Modalities  10/3                   CP  10'                   TENS to L shoulder 10' during CP

## 2019-10-08 ENCOUNTER — OFFICE VISIT (OUTPATIENT)
Dept: PHYSICAL THERAPY | Facility: CLINIC | Age: 59
End: 2019-10-08
Payer: COMMERCIAL

## 2019-10-08 DIAGNOSIS — M75.42 IMPINGEMENT SYNDROME OF LEFT SHOULDER: ICD-10-CM

## 2019-10-08 DIAGNOSIS — M25.512 LEFT SHOULDER PAIN, UNSPECIFIED CHRONICITY: Primary | ICD-10-CM

## 2019-10-08 PROCEDURE — 97140 MANUAL THERAPY 1/> REGIONS: CPT

## 2019-10-08 PROCEDURE — 97014 ELECTRIC STIMULATION THERAPY: CPT

## 2019-10-08 PROCEDURE — 97110 THERAPEUTIC EXERCISES: CPT

## 2019-10-10 ENCOUNTER — OFFICE VISIT (OUTPATIENT)
Dept: PHYSICAL THERAPY | Facility: CLINIC | Age: 59
End: 2019-10-10
Payer: COMMERCIAL

## 2019-10-10 DIAGNOSIS — M75.42 IMPINGEMENT SYNDROME OF LEFT SHOULDER: ICD-10-CM

## 2019-10-10 DIAGNOSIS — M25.512 LEFT SHOULDER PAIN, UNSPECIFIED CHRONICITY: Primary | ICD-10-CM

## 2019-10-10 PROCEDURE — 97110 THERAPEUTIC EXERCISES: CPT | Performed by: PHYSICAL THERAPIST

## 2019-10-10 PROCEDURE — 97140 MANUAL THERAPY 1/> REGIONS: CPT | Performed by: PHYSICAL THERAPIST

## 2019-10-10 PROCEDURE — 97112 NEUROMUSCULAR REEDUCATION: CPT | Performed by: PHYSICAL THERAPIST

## 2019-10-10 NOTE — PROGRESS NOTES
Daily Note     Today's date: 10/10/2019  Patient name: Marilin Sullivan  : 1960  MRN: [id number]  Referring provider: Aaron Golden DO  Dx: No diagnosis found  Subjective: She reports that her shoulder down to her arm and wrist feel extremely tight, which causes a constant ache  She wrapped her wrist and elbow with a bandage last night, which helped to decrease her pain and made it easier to sleep  Pt reports that her shoulder has been feeling a little bit better since her last visit  Objective: See treatment diary below      Assessment: Tolerated treatment well  Patient is displaying what presents as hypersensitivity of the proximal L UE, as the pt's reports of "tightness" and "discomfort" decrease sigificantly with pressure  She responded excellently to use of the tennis ball for STM, with her most sensitive areas being lateral to the lateral epicondyle, and and the middle of the biceps muscle belly  Her PROM is within 90% of normal into flexion, IR and ER at 45 ABD  Her abduction is the most limited direction at this time, with a firm stop at approximately 110 degrees today  Pt would benefit from skilled PT to restore full motion to her L UE and to continue strengthening in order to return to baseline functioning  Plan: Continue per plan of care  Progress treatment as tolerated         Daily Treatment Diary    EPOC: 19  Precautions: Currently has subacute non-displaced greater tuberosity fracture of L proximal humerus  Co- Morbidities: Pt reports depression is being successfully managed- denies any suicidal thoughts/behaviors at this time  Patient Active Problem List   Diagnosis    Suicidal ideations    Severe episode of recurrent major depressive disorder, without psychotic features [facility] )    Generalized anxiety disorder    Moderate protein-calorie malnutrition ([facility] )    Acute pain of left shoulder    Impingement syndrome of left shoulder    Rib pain on left side Manual  10/3  10/8  10/10               L shoulder GHJ mobs            L Shoulder PROM    AFB  10'  7'                TPR/STM       STM with tennis ball  8'                                                                  15'                 Exercise Diary  10/3  10/8  10/10             HEP instruct/handout 5'          Arm Pulley's: flexion/ABD   2'/1'        Pendulums   m/l &a/p    20x ea 20x              UT stretch    20"  x4ea  2" x4 each             Post sh rolls    20x ea  20x ea             Scap Retraction    10"  x10  10 x10"             Supine AA sh flexion    5"  x10 10" x10              Supine AA sh ER    5"  x10  5" x10             Supine elbow flexion/extension   x10        Supine Shoulder ABD with arm support by therapist   x10        Standing cane ext/IR   5"x5  P!                                                                                                                                                                                                                                                                                      Modalities  10/3  10/8                 CP  10' 10'                 TENS to L shoulder 10' during CP  10' w/ CP

## 2019-10-15 ENCOUNTER — OFFICE VISIT (OUTPATIENT)
Dept: PHYSICAL THERAPY | Facility: CLINIC | Age: 59
End: 2019-10-15
Payer: COMMERCIAL

## 2019-10-15 DIAGNOSIS — M75.42 IMPINGEMENT SYNDROME OF LEFT SHOULDER: Primary | ICD-10-CM

## 2019-10-15 DIAGNOSIS — M25.512 LEFT SHOULDER PAIN, UNSPECIFIED CHRONICITY: ICD-10-CM

## 2019-10-15 PROCEDURE — 97010 HOT OR COLD PACKS THERAPY: CPT

## 2019-10-15 PROCEDURE — 97140 MANUAL THERAPY 1/> REGIONS: CPT

## 2019-10-15 PROCEDURE — 97014 ELECTRIC STIMULATION THERAPY: CPT

## 2019-10-15 PROCEDURE — 97110 THERAPEUTIC EXERCISES: CPT

## 2019-10-15 NOTE — PROGRESS NOTES
Daily Note     Today's date: 10/15/2019  Patient name: Shira Sargent  : 1960  MRN: [id number]  Referring provider: Vick Valverde DO  Dx:   Encounter Diagnosis     ICD-10-CM    1  Impingement syndrome of left shoulder M75 42    2  Left shoulder pain, unspecified chronicity M25 512                   Subjective:Pt tearful reporting she is still having too much pain she can't sleep  Pt c/o pain in forearm and bicep and ant shld  Objective: See treatment diary below      Assessment: Tolerated treatment fair  Patient w/ mm tension in bicep, UT and pec  Worked on pain control for pt to sleep better  Pt states the shld was "numb and calm" at end of session post ice and TENS  Plan: Continue per plan of care  Progress treatment as tolerated         Daily Treatment Diary    EPOC: 19  Precautions: Currently has subacute non-displaced greater tuberosity fracture of L proximal humerus  Co- Morbidities: Pt reports depression is being successfully managed- denies any suicidal thoughts/behaviors at this time  Patient Active Problem List   Diagnosis    Suicidal ideations    Severe episode of recurrent major depressive disorder, without psychotic features ([facility] [address] )    Generalized anxiety disorder    Moderate protein-calorie malnutrition ([facility] )    Acute pain of left shoulder    Impingement syndrome of left shoulder    Rib pain on left side       Manual  10/3  10/8  10/10  10/15         L shoulder GHJ mobs          L Shoulder PROM    AFB  10'  7'  JK          TPR/STM       STM with tennis ball  8'  JK  UT/bicep/pec                                                    15'  15'           Exercise Diary  10/3  10/8  10/15           HEP instruct/handout 5'         Arm Pulley's: flexion/ABD   2' flex       Pendulums   m/l &a/p    20x ea             UT stretch    20"  x4ea  20"x3           Post sh rolls    20x ea 20x           Scap Retraction    10"  x10             Supine AA sh flexion    5"  x10 10x10" Supine AA sh ER    5"  x10  10x5"           Supine elbow flexion/extension          Supine Shoulder ABD with arm support by therapist          Standing cane ext/IR   5"x5  P!                                                                                                                                                                                                                                                          Modalities  10/3  10/8  10/15       CP  10' 10'  10'       TENS to L shoulder 10' during CP  10' w/ CP   10' w/ CP

## 2019-10-17 ENCOUNTER — APPOINTMENT (OUTPATIENT)
Dept: PHYSICAL THERAPY | Facility: CLINIC | Age: 59
End: 2019-10-17
Payer: COMMERCIAL

## 2019-10-22 ENCOUNTER — APPOINTMENT (OUTPATIENT)
Dept: PHYSICAL THERAPY | Facility: CLINIC | Age: 59
End: 2019-10-22
Payer: COMMERCIAL

## 2019-10-29 ENCOUNTER — OFFICE VISIT (OUTPATIENT)
Dept: PHYSICAL THERAPY | Facility: CLINIC | Age: 59
End: 2019-10-29
Payer: COMMERCIAL

## 2019-10-29 DIAGNOSIS — M75.42 IMPINGEMENT SYNDROME OF LEFT SHOULDER: Primary | ICD-10-CM

## 2019-10-29 DIAGNOSIS — M25.512 LEFT SHOULDER PAIN, UNSPECIFIED CHRONICITY: ICD-10-CM

## 2019-10-29 PROCEDURE — 97110 THERAPEUTIC EXERCISES: CPT | Performed by: PHYSICAL THERAPIST

## 2019-10-29 PROCEDURE — 97112 NEUROMUSCULAR REEDUCATION: CPT | Performed by: PHYSICAL THERAPIST

## 2019-10-29 PROCEDURE — 97140 MANUAL THERAPY 1/> REGIONS: CPT | Performed by: PHYSICAL THERAPIST

## 2019-10-29 NOTE — PROGRESS NOTES
PT Discharge    Today's date: 10/29/2019  Patient name: Agata Kelley  : 1960  MRN: [id number]  Referring provider: Katie Davis DO  Dx:   Encounter Diagnosis     ICD-10-CM    1  Impingement syndrome of left shoulder M75 42    2  Left shoulder pain, unspecified chronicity M25 512                   Assessment  Assessment details: Agata Kelley is a 66 y o  female being treated as an outpatient at [place] [address] PT w/ initial c/o Left shoulder pain s/p nondisplaced greater tuberosity fracture through the proximal humerus as a result of fall form [employer] on 19  Since IE, pt has made steady gains in activity tolerance/function, ROM, strength, and frequency of elevated pain, and would like to transition to updated HEP due to personal time constraints ( working 2 jobs and moving) making it difficult to attend PT tx sessions  Pt has been given updated HEP to progress to and encouraged to contact clinic w/ any questions/concerns upon d/c or if function worsens/plateaus  Thank you  Impairments: abnormal muscle tone, abnormal or restricted ROM, activity intolerance, impaired physical strength, pain with function and poor posture   Understanding of Dx/Px/POC: good   Prognosis: fair    Goals  ST  Pain decreased by 25% in 4-6 weeks  - Partially Met (improved activity tolerance)  2  ROM increased by 25% in 4-6 weeks  - Partially Met  3  Strength increased by 1/2 to 1 muscle grade in all deficient muscle groups in 4-6 weeks  - Partially met    LT  Decrease pain to 1-2/10 at worst by d/c - Not Met  2  Increase ROM to [facility] for all deficient movements by d/c - Partially Met  3  Strength increased to 5 for all deficient muscle groups by d/c - Not Met  4  IADL performance increased to max function by d/c - Partially Met  5   Recreational performance increased to max function by d/c - Partially Met    Plan  Planned therapy interventions: home exercise program  Frequency: D/c from skilled PT services; transition to updated [facility]    Treatment plan discussed with: patient        Subjective Evaluation    History of Present Illness  Date of onset: 2019  Mechanism of injury: CURRENT LEVEL  Pt reports significant progress since IE  Although still limited, pt reports significant improvements in ability to elevate shoulder, sleep, reach away from body, and perform functional IR/ER  She reports having the most difficulty pushing/pulling objects  She reports that more severe pain is occurring less frequently  Pt reports rib pain has improved  Due to pt working 2 jobs and moving, making it difficult for her to attend tx sessions, she would like today to be her last scheduled PT visit  Plan for d/c and transition to updated HEP  PREVIOUS LEVEL  Pt reports to IE s/p nondisplaced greater tuberosity fracture through the proximal humerus and L shoulder pain which began as a result of fall from [employer] while working out  Pt reports falling onto L side causing "popping" of L shoulder  Pt reports going to urgent care that day  She reports having an x-ray taken and was given a sling to wear  However, she went to see an orthopedic on 9/3/19 who dx'd her w/ fracture  Pt reports intermittent parasthesias into LUE, but unsure of any specific activities which trigger parasthesias  Pt reports that she also injured L rib cage during fall, but this has been improving w/ use of prescription anti-inflammatory cream   She adds that L wrist has been causing some pain recently- discussed that pt should inform ortho if symptoms do not improve  Pain  Current pain rating: 3  At worst pain ratin  Location: L shoulder  Relieving factors: ice, medications, rest and support (Aleve PRN, but less vs  previously)  Exacerbated by: L shoulder elevation, lifting, sleeping (improved w/ pillow support under LUE), functional IR, functional ER, reaching away from body, pushing/pulling objects      Social Support  Patient lives at: House  Lives with: roommate  Employment status: working (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; also recently started another  job)  Hand dominance: right      Diagnostic Tests  Abnormal x-ray: 926:nondisplaced fracture    Abnormal MRI: 9/11: possible inferior glenohumeral labroligamentous complex injury; nondisplaced greater tuberosity fracture of the proximal humerus   Patient Goals  Patient goals for therapy: decreased pain and increased motion (Partially Met)          Objective     Active Range of Motion   Left Shoulder   Flexion: 141 degrees with pain  Abduction: 101 degrees with pain  External rotation 45°: 70 degrees with pain  Internal rotation 45°: 66 degrees with pain      Passive Range of Motion   Left Shoulder   Flexion: 160 degrees with pain  Abduction: 120 degrees with pain  External rotation 45°: 74 degrees with pain  Internal rotation 45°: 66 degrees with pain    Strength/Myotome Testing     Left Shoulder     Planes of Motion   Flexion: 4+ (*pain)   Abduction: 4 (*pain)   External rotation at 45°: 4+ (*pain)   Internal rotation at 45°: 4+     General Comments:      Shoulder Comments   Observation/Posture:  Pt sits w/ fwd, rounded shoulders w/ fwd head    Palpation: Tenderness w/ palpation to lateral shoulder, anterior shoulder; hypertonicity/tenderness w/ palpation to biceps muscle belly        Flowsheet Rows      Most Recent Value   PT/OT G-Codes   Current Score  61   Projected Score  65              Daily Treatment Diary    Precautions: Currently has subacute non-displaced greater tuberosity fracture of L proximal humerus  Co- Morbidities: Pt reports depression is being successfully managed- denies any suicidal thoughts/behaviors at this time  Patient Active Problem List   Diagnosis    Suicidal ideations    Severe episode of recurrent major depressive disorder, without psychotic features (HCC)    Generalized anxiety disorder    Moderate protein-calorie malnutrition (HCC)    Acute pain of left shoulder    Impingement syndrome of left shoulder    Rib pain on left side       Manual  10/3  10/8  10/10  10/15  10/29       L shoulder GHJ mobs     RB     L Shoulder PROM    AFB  10'  7'  JK  RB        TPR/STM       STM with tennis ball  8'  JK  UT/bicep/pec  RB        Progress Note         RB                                15'  15'  18'         Exercise Diary  10/3  10/8  10/15  10/29       HEP instruct/handout 5'        Arm Pulley's: flexion/ABD   2' flex 3' flex     Pendulums   m/l &a/p    20x ea    HEP       UT stretch    20"  x4ea  20"x3         Post sh rolls    20x ea 20x        Scap Retraction    10"  x10           Supine AA sh flexion    5"  x10 10x10"   10"x10       Supine AA sh ER    5"  x10  10x5"  10"x10       Supine elbow flexion/extension         Supine Shoulder ABD with arm support by therapist         Standing cane ext/IR   5"x5  P!             Iso shoulder strengthening       10" x10 flex, ext, IR, ER       Standing active flexion/scaption      5x ea        Mod prone rows/ext       15x ea                                                                                                                                                                         Modalities  10/3  10/8  10/15  10/29     CP  10' 10'  10'  declined     TENS to L shoulder 10' during CP  10' w/ CP   10' w/ CP   declined

## 2019-10-31 ENCOUNTER — APPOINTMENT (OUTPATIENT)
Dept: PHYSICAL THERAPY | Facility: CLINIC | Age: 59
End: 2019-10-31
Payer: COMMERCIAL

## 2020-08-15 NOTE — PROGRESS NOTES
Daily Note     Today's date: 10/8/2019  Patient name: Lamine Coelho  : 1960  MRN: [id number]  Referring provider: Lauren Ailcia DO  Dx:   Encounter Diagnosis     ICD-10-CM    1  Left shoulder pain, unspecified chronicity M25 512    2  Impingement syndrome of left shoulder M75 42        Start Time: 0155  Stop Time: 09  Total time in clinic (min): 43 minutes    Subjective: Pt reports pain in L shldr rated as 4/10  States she has been having a hard time sleeping 2* pain/ache, with difficulty finding a comfortable position  Continues to experience occasional pain that radiates down L bicep and sometimes in L wrist     Received without wearing sling  Objective: See treatment diary below      Assessment: Tolerated treatment well  Tends to shrug shoulders as well as have lateral trunk bend during UT stretch  Was able to mostly self correct with cues provided  Pt reported increased pain in anterior aspect of L shldr during AA cane ext/IR  Additional reps held and pain began to resolve with CP and TENS post session  Patient would benefit from continued PT to in effort to further decrease pain levels, increase strength/ROM, and improve function  Plan: Continue per plan of care  Assess response to treatment NV       Daily Treatment Diary    EPOC: 19  Precautions: Currently has subacute non-displaced greater tuberosity fracture of L proximal humerus  Co- Morbidities: Pt reports depression is being successfully managed- denies any suicidal thoughts/behaviors at this time  Patient Active Problem List   Diagnosis    Suicidal ideations    Severe episode of recurrent major depressive disorder, without psychotic features ([facility] [address] )    Generalized anxiety disorder    Moderate protein-calorie malnutrition ([facility] [address] )    Acute pain of left shoulder    Impingement syndrome of left shoulder    Rib pain on left side       Manual  10/3  10/8                 L shoulder GHJ mobs            L Shoulder PROM [employer]  10'                  TPR/STM                                                                                          Exercise Diary  10/3  10/8               HEP instruct/handout 5'          Pendulums   m/l &a/p    20x ea               UT stretch    20"  x4ea               Post sh rolls    20x ea               Scap Retraction    10"  x10               Supine AA sh flexion    5"  x10               Supine AA sh ER    5"  x10               Standing cane ext/IR   5"x5  P!                                                                                                                                                                                                                                                                                      Modalities  10/3  10/8                 CP  10' 10'                 TENS to L shoulder 10' during CP  10' w/ CP Clear

## 2020-08-19 NOTE — PROGRESS NOTES
Progress Note - Behavioral Health     Savanna Noriega 56 y o  female MRN: @MRN   Unit/Bed#: [facility] 086-30 Encounter: [id number]    Per nursing report and sign out, patient had no significant issues    Savanna Noriega reports today that she is having a good day  Saw a nutritionist  Eating better  Depression "not depressed today"  Anxious "a little"  She was overwhelmed with moving here, finances, relationship issues but they have made some positive changes in communications to 'work together' to make the process better  Going to counseling, looking forward to that  Working on positive self talk, going to groups, self improvement  Anxiety from pone call with her mother  "My mom likes drama"  Otherwise, feels things are progressing well for her  Trazodone was taken "to be numb" she feels   Denies SA in past     Sleep: normal  Appetite: improving  Medication side effects: No   ROS: no complaints    Mental Status Evaluation:    Appearance:  age appropriate   Behavior:  pleasant, cooperative, with good eye contact   Speech:  Normal volume, regular rate and rhythm, hyperverbal but not pressured   Mood:  dysphoric, anxious   Affect:  constricted       Thought Process:  Linear and goal directed, goal directed, circumferential   Associations: intact associations   Thought Content:  normal and appropriate   Perceptual Disturbances: no auditory or visual hallcunations   Risk Potential: Suicidal ideation - None  Homicidal ideation - None  Potential for aggression - No   Sensorium:  A&Ox3   Cognition:  grossly intact   Consciousness:  Alert & Awake   Memory:  recent and remote memory grossly intact   Attention: attention span and concentration appear shorter than expected for age   Intellect: within normal limits       Insight:  fair   Judgment: fair   Muscle Strength  Muscle Tone normal  normal   Gait/Station: normal gait/station with good balance   Motor Activity: no abnormal movements       Vital signs in last 24 pts wife informed and vu. hours: Temp:  [96 5 °F (35 8 °C)-97 4 °F (36 3 °C)] 96 5 °F (35 8 °C)  HR:  [60-84] 77  Resp:  [14-18] 18  BP: (115-132)/(64-74) 115/64    Laboratory results:  I have personally reviewed all pertinent laboratory/tests results  Progress Toward Goals:  slow improvement  Assessment/Plan   Principal Problem:    Severe episode of recurrent major depressive disorder, without psychotic features ([facility] [address] )  Active Problems:    Generalized anxiety disorder      Iza Jeffries is making progress, tolerating meds well, denies side effects or issues  Minimizes overdose however  Needs a little more time/stabilization  Recommended Treatment:     Planned medication and treatment changes: All current active medications have been reviewed  Encourage group therapy, milieu therapy and occupational therapy  [address] checks as unit standard unless ordered or noted otherwise      Current Facility-Administered Medications:  acetaminophen 650 mg Oral Q6H PRN Symone Montiel MD   acetaminophen 975 mg Oral Q6H PRN Symone Montiel MD   aluminum-magnesium hydroxide-simethicone 30 mL Oral Q4H PRN Godwin Coreas, MD   benztropine 1 mg Intramuscular Q6H PRN Godwin Coreas MD   benztropine 1 mg Oral Q6H PRN Godwin Coreas, MD   ibuprofen 600 mg Oral Q6H PRN Symone Montiel MD   LORazepam 2 mg Intramuscular Q4H PRN Godwin Coreas MD   LORazepam 1 mg Oral Q4H PRN Godwin Coreas, MD   magnesium hydroxide 30 mL Oral Daily PRN Godwin Coreas MD   mirtazapine 15 mg Oral HS Aide Tatum   nicotine 1 patch Transdermal Daily Godwin Coreas MD   OLANZapine 5 mg Oral Q3H PRN Godwin Coreas MD   traZODone 50 mg Oral HS PRN Godwin Coreas MD       1) continue current treatment  2) Continue to support patient and engage them in the programs available as feasible and appropriate  Continue case management support and therapy  Continue discharge planning      Risks / Benefits of Treatment:    Risks, benefits, and possible side effects of medications explained to patient and patient verbalizes understanding and agreement for treatment  Counseling / Coordination of Care:    Patient's progress reviewed with nursing staff  Medications, treatment progress and treatment plan reviewed with patient        Woodrow Berger III, DO  9/24/2018  12:13 PM

## 2021-04-06 LAB
LEFT EYE DIABETIC RETINOPATHY: NORMAL
RIGHT EYE DIABETIC RETINOPATHY: NORMAL

## 2021-04-10 ENCOUNTER — IMMUNIZATIONS (OUTPATIENT)
Dept: FAMILY MEDICINE CLINIC | Facility: HOSPITAL | Age: 61
End: 2021-04-10

## 2021-04-10 DIAGNOSIS — Z23 ENCOUNTER FOR IMMUNIZATION: Primary | ICD-10-CM

## 2021-04-10 PROCEDURE — 0001A SARS-COV-2 / COVID-19 MRNA VACCINE (PFIZER-BIONTECH) 30 MCG: CPT

## 2021-04-10 PROCEDURE — 91300 SARS-COV-2 / COVID-19 MRNA VACCINE (PFIZER-BIONTECH) 30 MCG: CPT

## 2021-05-02 ENCOUNTER — IMMUNIZATIONS (OUTPATIENT)
Dept: FAMILY MEDICINE CLINIC | Facility: HOSPITAL | Age: 61
End: 2021-05-02

## 2021-05-02 DIAGNOSIS — Z23 ENCOUNTER FOR IMMUNIZATION: Primary | ICD-10-CM

## 2021-05-02 PROCEDURE — 0002A SARS-COV-2 / COVID-19 MRNA VACCINE (PFIZER-BIONTECH) 30 MCG: CPT | Performed by: NURSE PRACTITIONER

## 2021-05-02 PROCEDURE — 91300 SARS-COV-2 / COVID-19 MRNA VACCINE (PFIZER-BIONTECH) 30 MCG: CPT | Performed by: NURSE PRACTITIONER

## 2021-08-03 ENCOUNTER — OFFICE VISIT (OUTPATIENT)
Dept: FAMILY MEDICINE CLINIC | Facility: CLINIC | Age: 61
End: 2021-08-03
Payer: COMMERCIAL

## 2021-08-03 VITALS
HEIGHT: 66 IN | SYSTOLIC BLOOD PRESSURE: 124 MMHG | BODY MASS INDEX: 22.34 KG/M2 | TEMPERATURE: 97.5 F | OXYGEN SATURATION: 98 % | WEIGHT: 139 LBS | HEART RATE: 77 BPM | DIASTOLIC BLOOD PRESSURE: 80 MMHG

## 2021-08-03 DIAGNOSIS — Z12.31 ENCOUNTER FOR SCREENING MAMMOGRAM FOR MALIGNANT NEOPLASM OF BREAST: ICD-10-CM

## 2021-08-03 DIAGNOSIS — Z00.00 ANNUAL PHYSICAL EXAM: Primary | ICD-10-CM

## 2021-08-03 DIAGNOSIS — E89.41 HOT FLASHES DUE TO SURGICAL MENOPAUSE: ICD-10-CM

## 2021-08-03 DIAGNOSIS — Z12.11 COLON CANCER SCREENING: ICD-10-CM

## 2021-08-03 DIAGNOSIS — F41.1 GENERALIZED ANXIETY DISORDER: Chronic | ICD-10-CM

## 2021-08-03 DIAGNOSIS — R73.01 IFG (IMPAIRED FASTING GLUCOSE): ICD-10-CM

## 2021-08-03 PROBLEM — E44.0 MODERATE PROTEIN-CALORIE MALNUTRITION (HCC): Status: RESOLVED | Noted: 2018-09-26 | Resolved: 2021-08-03

## 2021-08-03 PROBLEM — F33.2 SEVERE EPISODE OF RECURRENT MAJOR DEPRESSIVE DISORDER, WITHOUT PSYCHOTIC FEATURES (HCC): Chronic | Status: RESOLVED | Noted: 2018-09-21 | Resolved: 2021-08-03

## 2021-08-03 PROBLEM — M75.42 IMPINGEMENT SYNDROME OF LEFT SHOULDER: Status: RESOLVED | Noted: 2019-09-03 | Resolved: 2021-08-03

## 2021-08-03 PROBLEM — R45.851 SUICIDAL IDEATIONS: Status: RESOLVED | Noted: 2018-09-21 | Resolved: 2021-08-03

## 2021-08-03 PROBLEM — F17.210 CIGARETTE NICOTINE DEPENDENCE WITHOUT COMPLICATION: Status: ACTIVE | Noted: 2021-08-03

## 2021-08-03 PROBLEM — M25.512 ACUTE PAIN OF LEFT SHOULDER: Status: RESOLVED | Noted: 2019-09-03 | Resolved: 2021-08-03

## 2021-08-03 PROBLEM — R07.81 RIB PAIN ON LEFT SIDE: Status: RESOLVED | Noted: 2019-09-12 | Resolved: 2021-08-03

## 2021-08-03 PROBLEM — F17.210 CIGARETTE NICOTINE DEPENDENCE WITHOUT COMPLICATION: Status: RESOLVED | Noted: 2021-08-03 | Resolved: 2021-08-03

## 2021-08-03 PROCEDURE — 99386 PREV VISIT NEW AGE 40-64: CPT | Performed by: FAMILY MEDICINE

## 2021-08-03 RX ORDER — ESTRADIOL 0.5 MG/1
0.5 TABLET ORAL DAILY
Qty: 30 TABLET | Refills: 1 | Status: SHIPPED | OUTPATIENT
Start: 2021-08-03 | End: 2021-12-08 | Stop reason: SDUPTHER

## 2021-08-03 RX ORDER — CLONAZEPAM 0.5 MG/1
0.5 TABLET ORAL 2 TIMES DAILY PRN
Qty: 30 TABLET | Refills: 0 | Status: SHIPPED | OUTPATIENT
Start: 2021-08-03 | End: 2021-09-30 | Stop reason: SDUPTHER

## 2021-08-03 NOTE — PATIENT INSTRUCTIONS
Get labs as ordered  Get mammogram   Return colo guard kit when it arrives  Trial of clonazepam 0 5 mg twice daily as needed  Follow-up in 4-6 weeks    Wellness Visit for Adults   AMBULATORY CARE:   A wellness visit  is when you see your healthcare provider to get screened for health problems  Your healthcare provider will also give you advice on how to stay healthy  Write down your questions so you remember to ask them  Ask your healthcare provider how often you should have a wellness visit  What happens at a wellness visit:  Your healthcare provider will ask about your health, and your family history of health problems  This includes high blood pressure, heart disease, and cancer  He or she will ask if you have symptoms that concern you, if you smoke, and about your mood  You may also be asked about your intake of medicines, supplements, food, and alcohol  Any of the following may be done:  · Your weight  will be checked  Your height may also be checked so your body mass index (BMI) can be calculated  Your BMI shows if you are at a healthy weight  · Your blood pressure  and heart rate will be checked  Your temperature may also be checked  · Blood and urine tests  may be done  Blood tests may be done to check your cholesterol levels  Abnormal cholesterol levels increase your risk for heart disease and stroke  You may also need a blood or urine test to check for diabetes if you are at increased risk  Urine tests may be done to look for signs of an infection or kidney disease  · A physical exam  includes checking your heartbeat and lungs with a stethoscope  Your healthcare provider may also check your skin to look for sun damage  · Screening tests  may be recommended  A screening test is done to check for diseases that may not cause symptoms  The screening tests you may need depend on your age, gender, family history, and lifestyle habits   For example, colorectal screening may be recommended if you are 48years old or older  Screening tests you need if you are a woman:   · A Pap smear  is used to screen for cervical cancer  Pap smears are usually done every 3 to 5 years depending on your age  You may need them more often if you have had abnormal Pap smear test results in the past  Ask your healthcare provider how often you should have a Pap smear  · A mammogram  is an x-ray of your breasts to screen for breast cancer  Experts recommend mammograms every 2 years starting at age 48 years  You may need a mammogram at age 52 years or younger if you have an increased risk for breast cancer  Talk to your healthcare provider about when you should start having mammograms and how often you need them  Vaccines you may need:   · Get an influenza vaccine  every year  The influenza vaccine protects you from the flu  Several types of viruses cause the flu  The viruses change over time, so new vaccines are made each year  · Get a tetanus-diphtheria (Td) booster vaccine  every 10 years  This vaccine protects you against tetanus and diphtheria  Tetanus is a severe infection that may cause painful muscle spasms and lockjaw  Diphtheria is a severe bacterial infection that causes a thick covering in the back of your mouth and throat  · Get a human papillomavirus (HPV) vaccine  if you are female and aged 23 to 32 or male 23 to 24 and never received it  This vaccine protects you from HPV infection  HPV is the most common infection spread by sexual contact  HPV may also cause vaginal, penile, and anal cancers  · Get a pneumococcal vaccine  if you are aged 72 years or older  The pneumococcal vaccine is an injection given to protect you from pneumococcal disease  Pneumococcal disease is an infection caused by pneumococcal bacteria  The infection may cause pneumonia, meningitis, or an ear infection  · Get a shingles vaccine  if you are 60 or older, even if you have had shingles before   The shingles vaccine is an injection to protect you from the varicella-zoster virus  This is the same virus that causes chickenpox  Shingles is a painful rash that develops in people who had chickenpox or have been exposed to the virus  How to eat healthy:  My Plate is a model for planning healthy meals  It shows the types and amounts of foods that should go on your plate  Fruits and vegetables make up about half of your plate, and grains and protein make up the other half  A serving of dairy is included on the side of your plate  The amount of calories and serving sizes you need depends on your age, gender, weight, and height  Examples of healthy foods are listed below:  · Eat a variety of vegetables  such as dark green, red, and orange vegetables  You can also include canned vegetables low in sodium (salt) and frozen vegetables without added butter or sauces  · Eat a variety of fresh fruits , canned fruit in 100% juice, frozen fruit, and dried fruit  · Include whole grains  At least half of the grains you eat should be whole grains  Examples include whole-wheat bread, wheat pasta, brown rice, and whole-grain cereals such as oatmeal     · Eat a variety of protein foods such as seafood (fish and shellfish), lean meat, and poultry without skin (turkey and chicken)  Examples of lean meats include pork leg, shoulder, or tenderloin, and beef round, sirloin, tenderloin, and extra lean ground beef  Other protein foods include eggs and egg substitutes, beans, peas, soy products, nuts, and seeds  · Choose low-fat dairy products such as skim or 1% milk or low-fat yogurt, cheese, and cottage cheese  · Limit unhealthy fats  such as butter, hard margarine, and shortening  Exercise:  Exercise at least 30 minutes per day on most days of the week  Some examples of exercise include walking, biking, dancing, and swimming   You can also fit in more physical activity by taking the stairs instead of the elevator or parking farther away from stores  Include muscle strengthening activities 2 days each week  Regular exercise provides many health benefits  It helps you manage your weight, and decreases your risk for type 2 diabetes, heart disease, stroke, and high blood pressure  Exercise can also help improve your mood  Ask your healthcare provider about the best exercise plan for you  General health and safety guidelines:   · Do not smoke  Nicotine and other chemicals in cigarettes and cigars can cause lung damage  Ask your healthcare provider for information if you currently smoke and need help to quit  E-cigarettes or smokeless tobacco still contain nicotine  Talk to your healthcare provider before you use these products  · Limit alcohol  A drink of alcohol is 12 ounces of beer, 5 ounces of wine, or 1½ ounces of liquor  · Lose weight, if needed  Being overweight increases your risk of certain health conditions  These include heart disease, high blood pressure, type 2 diabetes, and certain types of cancer  · Protect your skin  Do not sunbathe or use tanning beds  Use sunscreen with a SPF 15 or higher  Apply sunscreen at least 15 minutes before you go outside  Reapply sunscreen every 2 hours  Wear protective clothing, hats, and sunglasses when you are outside  · Drive safely  Always wear your seatbelt  Make sure everyone in your car wears a seatbelt  A seatbelt can save your life if you are in an accident  Do not use your cell phone when you are driving  This could distract you and cause an accident  Pull over if you need to make a call or send a text message  · Practice safe sex  Use latex condoms if are sexually active and have more than one partner  Your healthcare provider may recommend screening tests for sexually transmitted infections (STIs)  · Wear helmets, lifejackets, and protective gear  Always wear a helmet when you ride a bike or motorcycle, go skiing, or play sports that could cause a head injury   Wear protective equipment when you play sports  Wear a lifejacket when you are on a boat or doing water sports  © Copyright [employer] 2021 Information is for End User's use only and may not be sold, redistributed or otherwise used for commercial purposes  All illustrations and images included in CareNotes® are the copyrighted property of [employer]  or Manuel Kelly  The above information is an  only  It is not intended as medical advice for individual conditions or treatments  Talk to your doctor, nurse or pharmacist before following any medical regimen to see if it is safe and effective for you  Cigarette Smoking and Your Health   AMBULATORY CARE:   Risks to your health if you smoke:  Nicotine and other chemicals found in tobacco and e-cigarettes can damage every cell in your body  Even if you are a light smoker, you have an increased risk for cancer, heart disease, and lung disease  If you are pregnant or have diabetes, smoking increases your risk for complications  Nicotine can affect an adolescent's developing brain  This can lead to trouble thinking, learning, or paying attention  Benefits to your health if you stop smoking:   · You decrease respiratory symptoms such as coughing, wheezing, and shortness of breath  · You reduce your risk for cancers of the lung, mouth, throat, kidney, bladder, pancreas, stomach, and cervix  If you already have cancer, you increase the benefits of chemotherapy  You also reduce your risk for cancer returning or a second cancer from developing  · You reduce your risk for heart disease, blood clots, heart attack, and stroke  · You reduce your risk for lung infections, and diseases such as pneumonia, asthma, chronic bronchitis, and emphysema  · Your circulation improves  More oxygen can be delivered to your body  If you have diabetes, you lower your risk for complications, such as kidney, artery, and eye diseases   You also lower your risk for nerve damage  Nerve damage can lead to amputations, poor vision, and blindness  · You improve your body's ability to heal and to fight infections  · An adolescent can help his or her brain and body develop in a healthy way  Talk to your adolescent about all the health risks of nicotine  If you can, start talking about nicotine when your child is younger than 12 years  This may make it easier for him or her not to start using nicotine as a teenager or adult  Explain to him or her that it is best never to start  It can be hard to try to quit later  Benefits to the health of others if you stop smoking:  Tobacco is harmful to nonsmokers who breathe in your secondhand smoke  The following are ways the health of others around you may improve when you stop smoking:  · You lower the risks for lung cancer and heart disease in nonsmoking adults  · If you are pregnant, you lower the risk for miscarriage, early delivery, low birth weight, and stillbirth  You also lower your baby's risk for SIDS, obesity, developmental delay, and neurobehavioral problems, such as ADHD  · If you have children, you lower their risk for ear infections, colds, pneumonia, bronchitis, and asthma  Follow up with your doctor as directed:  Write down your questions so you remember to ask them during your visits  For support and more information:   · American [employer] Association  [address] [facility]  [address]  Phone: [facility] [address]  Phone: 9- 279 - 553-6351  Web Address: [employer]    · Smokefree  gov  Phone: 4- 235 - 397-3550  Web Address: www smokefree  [facility] [address] 2021 Information is for End User's use only and may not be sold, redistributed or otherwise used for commercial purposes  All illustrations and images included in CareNotes® are the copyrighted property of [employer]  or [address] [place] [address]  The above information is an  only   It is not intended as medical advice for individual conditions or treatments  Talk to your doctor, nurse or pharmacist before following any medical regimen to see if it is safe and effective for you

## 2021-08-03 NOTE — PROGRESS NOTES
[address]    NAME: Nikole Ospina  AGE: 64 y o  SEX: female  : 1960     DATE: 8/3/2021     Assessment and Plan:     Problem List Items Addressed This Visit        Endocrine    IFG (impaired fasting glucose)    Relevant Orders    Comprehensive metabolic panel       Other    Generalized anxiety disorder (Chronic)    Relevant Medications    clonazePAM (KlonoPIN) 0 5 mg tablet    Other Relevant Orders    TSH, 3rd generation    CBC and differential      Other Visit Diagnoses     Annual physical exam    -  Primary    Relevant Orders    Hemoglobin A1C    Lipid Panel with Direct LDL reflex    Encounter for screening mammogram for malignant neoplasm of breast        Relevant Orders    Mammo screening bilateral w 3d & cad    Colon cancer screening        Relevant Orders    Ambulatory referral for colonoscopy    Cologuard    Hot flashes due to surgical menopause        Relevant Medications    estradiol (ESTRACE) 0 5 MG tablet      Get labs as ordered  Get mammogram   Return colo guard kit when it arrives  Trial of clonazepam 0 5 mg twice daily as needed  Follow-up in 4-6 weeks        Immunizations and preventive care screenings were discussed with patient today  Appropriate education was printed on patient's after visit summary  Counseling:  Alcohol/drug use: discussed moderation in alcohol intake, the recommendations for healthy alcohol use, and avoidance of illicit drug use  Dental Health: discussed importance of regular tooth brushing, flossing, and dental visits  Injury prevention: discussed safety/seat belts, safety helmets, smoke detectors, carbon dioxide detectors, and smoking near bedding or upholstery  · Exercise: the importance of regular exercise/physical activity was discussed  Recommend exercise 3-5 times per week for at least 30 minutes         Tobacco Cessation Counseling: Tobacco cessation counseling was provided  The patient is sincerely urged to quit consumption of tobacco  She is not ready to quit tobacco  Medication options discussed  Nicotine gum was prescribed  Only 1-2 cigs /week      No follow-ups on file  Chief Complaint:     Chief Complaint   Patient presents with   [facility] Patient Visit     stablish      History of Present Illness:     Adult Annual Physical   Patient here for a comprehensive physical exam  The patient reports see list     Diet and Physical Activity  · Diet/Nutrition: well balanced diet, limited junk food and consuming 3-5 servings of fruits/vegetables daily  · Exercise: walking and 3-4 times a week on average  Depression Screening  PHQ-9 Depression Screening    PHQ-9:   Frequency of the following problems over the past two weeks:      Little interest or pleasure in doing things: 0 - not at all  Feeling down, depressed, or hopeless: 0 - not at all  Trouble falling or staying asleep, or sleeping too much: 1 - several days  Feeling tired or having little energy: 0 - not at all  Poor appetite or overeatin - not at all  Feeling bad about yourself - or that you are a failure or have let yourself or your family down: 0 - not at all  Trouble concentrating on things, such as reading the newspaper or watching television: 0 - not at all  Moving or speaking so slowly that other people could have noticed  Or the opposite - being so fidgety or restless that you have been moving around a lot more than usual: 0 - not at all  Thoughts that you would be better off dead, or of hurting yourself in some way: 0 - not at all  PHQ-2 Score: 0  PHQ-9 Score: 1       General Health  · Sleep: sleeps poorly  · Hearing: normal - bilateral   · Vision: readers  · Dental: brushes teeth once daily and flosses teeth occasionally  /GYN Health  · Patient is: postmenopausal  · Last menstrual period: -  · Contraceptive method: n/a       Review of Systems:     Review of Systems   Constitutional: Negative for activity change, appetite change, diaphoresis and fatigue  Respiratory: Negative for cough, chest tightness, shortness of breath and wheezing  Cardiovascular: Negative for chest pain, palpitations and leg swelling  Fast or slow heart rate   Gastrointestinal: Negative for abdominal pain, blood in stool, constipation, diarrhea, nausea and vomiting  Genitourinary: Negative for difficulty urinating, dysuria and frequency  Musculoskeletal: Negative for arthralgias, gait problem, joint swelling and myalgias  Neurological: Negative for dizziness, light-headedness and headaches  Psychiatric/Behavioral: Positive for sleep disturbance  Negative for agitation, confusion and dysphoric mood  The patient is nervous/anxious  Past Medical History:     Past Medical History:   Diagnosis Date    Acute pain of left shoulder 9/3/2019    Depression     Impingement syndrome of left shoulder 9/3/2019    Moderate protein-calorie malnutrition ([facility] ) 9/26/2018    Psychiatric disorder     Rib pain on left side 9/12/2019    Severe episode of recurrent major depressive disorder, without psychotic features ([facility] ) 9/21/2018    Suicidal ideations 9/21/2018      Past Surgical History:     Past Surgical History:   Procedure Laterality Date    FL INJECTION LEFT SHOULDER (ARTHROGRAM)  9/11/2019    HYSTERECTOMY        Social History:     Social History     Socioeconomic History    Marital status: Single     Spouse name: None    Number of children: None    Years of education: None    Highest education level: None   Occupational History    None   Tobacco Use    Smoking status: Current Every Day Smoker     Packs/day: 0 50    Smokeless tobacco: Never Used   Vaping Use    Vaping Use: Never used   Substance and Sexual Activity    Alcohol use:  Yes     Alcohol/week: 1 0 standard drinks     Types: 1 Glasses of wine per week     Comment: "sometimes"    Drug use: Yes     Types: Marijuana    Sexual activity: None Other Topics Concern    None   Social History Narrative    None     Social Determinants of Health     Financial Resource Strain:     Difficulty of Paying Living Expenses:    Food Insecurity:     Worried About Running Out of Food in the Last Year:     [address] in the Last Year:    Transportation Needs:     Lack of Transportation (Medical):  Lack of Transportation (Non-Medical):    Physical Activity:     Days of Exercise per Week:     Minutes of Exercise per Session:    Stress:     Feeling of Stress :    Social Connections:     Frequency of Communication with Friends and Family:     Frequency of Social Gatherings with Friends and Family:     Attends [employer] Services:     Active Member of Clubs or Organizations:     Attends Club or Organization Meetings:     Marital Status:    Intimate Partner Violence:     Fear of Current or Ex-Partner:     Emotionally Abused:     Physically Abused:     Sexually Abused:       Family History:     Family History   Problem Relation Age of Onset    Cancer Father     Cancer Sister       Current Medications:     Current Outpatient Medications   Medication Sig Dispense Refill    diclofenac sodium (VOLTAREN) 1 % Apply 2 g topically 4 (four) times a day 1 Tube 0    Naproxen Sodium (ALEVE PO) Take by mouth      clonazePAM (KlonoPIN) 0 5 mg tablet Take 1 tablet (0 5 mg total) by mouth 2 (two) times a day as needed for seizures 30 tablet 0    estradiol (ESTRACE) 0 5 MG tablet Take 1 tablet (0 5 mg total) by mouth daily 30 tablet 1     No current facility-administered medications for this visit  Allergies:     No Known Allergies   Physical Exam:     /80   Pulse 77   Temp 97 5 °F (36 4 °C)   Ht 5' 6" (1 676 m)   Wt 63 kg (139 lb)   SpO2 98%   BMI 22 44 kg/m²     Physical Exam  Vitals and nursing note reviewed  Constitutional:       General: She is not in acute distress  Appearance: She is well-developed     HENT:      Head: Normocephalic and atraumatic  Right Ear: External ear normal       Left Ear: External ear normal    Eyes:      General:         Right eye: No discharge  Left eye: No discharge  Pupils: Pupils are equal, round, and reactive to light  Neck:      Thyroid: No thyromegaly  Cardiovascular:      Rate and Rhythm: Normal rate and regular rhythm  Heart sounds: Normal heart sounds  No murmur heard  Pulmonary:      Effort: Pulmonary effort is normal  No respiratory distress  Breath sounds: Normal breath sounds  No wheezing or rales  Abdominal:      General: Bowel sounds are normal       Palpations: Abdomen is soft  There is no mass  Tenderness: There is no abdominal tenderness  Musculoskeletal:         General: No deformity  Normal range of motion  Cervical back: Normal range of motion and neck supple  Right lower leg: No edema  Left lower leg: No edema  Lymphadenopathy:      Cervical: No cervical adenopathy  Skin:     Findings: No rash  Neurological:      Mental Status: She is alert and oriented to person, place, and time  Psychiatric:         Mood and Affect: Mood is anxious           Behavior: Behavior normal           Gala Kwong MD  [address]

## 2021-08-04 ENCOUNTER — LAB (OUTPATIENT)
Dept: LAB | Facility: HOSPITAL | Age: 61
End: 2021-08-04
Payer: COMMERCIAL

## 2021-08-04 DIAGNOSIS — R73.01 IFG (IMPAIRED FASTING GLUCOSE): ICD-10-CM

## 2021-08-04 DIAGNOSIS — F41.1 GENERALIZED ANXIETY DISORDER: Chronic | ICD-10-CM

## 2021-08-04 DIAGNOSIS — Z00.00 ANNUAL PHYSICAL EXAM: ICD-10-CM

## 2021-08-04 LAB
ALBUMIN SERPL BCP-MCNC: 3.4 G/DL (ref 3.5–5)
ALP SERPL-CCNC: 59 U/L (ref 46–116)
ALT SERPL W P-5'-P-CCNC: 28 U/L (ref 12–78)
ANION GAP SERPL CALCULATED.3IONS-SCNC: 4 MMOL/L (ref 4–13)
AST SERPL W P-5'-P-CCNC: 12 U/L (ref 5–45)
BASOPHILS # BLD AUTO: 0.11 THOUSANDS/ΜL (ref 0–0.1)
BASOPHILS NFR BLD AUTO: 1 % (ref 0–1)
BILIRUB SERPL-MCNC: 0.28 MG/DL (ref 0.2–1)
BUN SERPL-MCNC: 8 MG/DL (ref 5–25)
CALCIUM ALBUM COR SERPL-MCNC: 9.6 MG/DL (ref 8.3–10.1)
CALCIUM SERPL-MCNC: 9.1 MG/DL (ref 8.3–10.1)
CHLORIDE SERPL-SCNC: 110 MMOL/L (ref 100–108)
CHOLEST SERPL-MCNC: 208 MG/DL (ref 50–200)
CO2 SERPL-SCNC: 27 MMOL/L (ref 21–32)
CREAT SERPL-MCNC: 0.95 MG/DL (ref 0.6–1.3)
EOSINOPHIL # BLD AUTO: 0.54 THOUSAND/ΜL (ref 0–0.61)
EOSINOPHIL NFR BLD AUTO: 6 % (ref 0–6)
ERYTHROCYTE [DISTWIDTH] IN BLOOD BY AUTOMATED COUNT: 13.1 % (ref 11.6–15.1)
EST. AVERAGE GLUCOSE BLD GHB EST-MCNC: 163 MG/DL
GFR SERPL CREATININE-BSD FRML MDRD: 66 ML/MIN/1.73SQ M
GLUCOSE P FAST SERPL-MCNC: 121 MG/DL (ref 65–99)
HBA1C MFR BLD: 7.3 %
HCT VFR BLD AUTO: 43.8 % (ref 34.8–46.1)
HDLC SERPL-MCNC: 48 MG/DL
HGB BLD-MCNC: 14.2 G/DL (ref 11.5–15.4)
IMM GRANULOCYTES # BLD AUTO: 0.03 THOUSAND/UL (ref 0–0.2)
IMM GRANULOCYTES NFR BLD AUTO: 0 % (ref 0–2)
LDLC SERPL CALC-MCNC: 115 MG/DL (ref 0–100)
LYMPHOCYTES # BLD AUTO: 3.79 THOUSANDS/ΜL (ref 0.6–4.47)
LYMPHOCYTES NFR BLD AUTO: 46 % (ref 14–44)
MCH RBC QN AUTO: 30.5 PG (ref 26.8–34.3)
MCHC RBC AUTO-ENTMCNC: 32.4 G/DL (ref 31.4–37.4)
MCV RBC AUTO: 94 FL (ref 82–98)
MONOCYTES # BLD AUTO: 0.7 THOUSAND/ΜL (ref 0.17–1.22)
MONOCYTES NFR BLD AUTO: 8 % (ref 4–12)
NEUTROPHILS # BLD AUTO: 3.29 THOUSANDS/ΜL (ref 1.85–7.62)
NEUTS SEG NFR BLD AUTO: 39 % (ref 43–75)
NRBC BLD AUTO-RTO: 0 /100 WBCS
PLATELET # BLD AUTO: 267 THOUSANDS/UL (ref 149–390)
PMV BLD AUTO: 11.8 FL (ref 8.9–12.7)
POTASSIUM SERPL-SCNC: 4.4 MMOL/L (ref 3.5–5.3)
PROT SERPL-MCNC: 7 G/DL (ref 6.4–8.2)
RBC # BLD AUTO: 4.66 MILLION/UL (ref 3.81–5.12)
SODIUM SERPL-SCNC: 141 MMOL/L (ref 136–145)
TRIGL SERPL-MCNC: 223 MG/DL
TSH SERPL DL<=0.05 MIU/L-ACNC: 2.34 UIU/ML (ref 0.36–3.74)
WBC # BLD AUTO: 8.46 THOUSAND/UL (ref 4.31–10.16)

## 2021-08-04 PROCEDURE — 83036 HEMOGLOBIN GLYCOSYLATED A1C: CPT

## 2021-08-04 PROCEDURE — 80061 LIPID PANEL: CPT

## 2021-08-04 PROCEDURE — 84443 ASSAY THYROID STIM HORMONE: CPT

## 2021-08-04 PROCEDURE — 80053 COMPREHEN METABOLIC PANEL: CPT

## 2021-08-04 PROCEDURE — 85025 COMPLETE CBC W/AUTO DIFF WBC: CPT

## 2021-08-04 PROCEDURE — 36415 COLL VENOUS BLD VENIPUNCTURE: CPT

## 2021-08-05 ENCOUNTER — TELEPHONE (OUTPATIENT)
Dept: FAMILY MEDICINE CLINIC | Facility: CLINIC | Age: 61
End: 2021-08-05

## 2021-08-05 NOTE — TELEPHONE ENCOUNTER
----- Message from Jovan Marcelo MD sent at 8/5/2021 12:47 PM EDT -----  Call patient with lab result-sugar is elevated  A1c up into the diabetic range  She should really attempt to watch her sweets and total calories  We can check this again in 3 months or start on medication now  She does have a follow-up appointment, can discuss options at that appointment also

## 2021-08-05 NOTE — RESULT ENCOUNTER NOTE
Call patient with lab result-sugar is elevated  A1c up into the diabetic range  She should really attempt to watch her sweets and total calories  We can check this again in 3 months or start on medication now  She does have a follow-up appointment, can discuss options at that appointment also

## 2021-08-11 ENCOUNTER — OFFICE VISIT (OUTPATIENT)
Dept: FAMILY MEDICINE CLINIC | Facility: CLINIC | Age: 61
End: 2021-08-11
Payer: COMMERCIAL

## 2021-08-11 VITALS
OXYGEN SATURATION: 97 % | SYSTOLIC BLOOD PRESSURE: 120 MMHG | BODY MASS INDEX: 22.31 KG/M2 | HEIGHT: 66 IN | HEART RATE: 68 BPM | WEIGHT: 138.8 LBS | DIASTOLIC BLOOD PRESSURE: 72 MMHG | TEMPERATURE: 97.9 F

## 2021-08-11 DIAGNOSIS — E11.9 TYPE 2 DIABETES MELLITUS WITHOUT COMPLICATION, WITHOUT LONG-TERM CURRENT USE OF INSULIN (HCC): Primary | ICD-10-CM

## 2021-08-11 DIAGNOSIS — F41.1 GENERALIZED ANXIETY DISORDER: Chronic | ICD-10-CM

## 2021-08-11 LAB
CREAT UR-MCNC: 116 MG/DL
MICROALBUMIN UR-MCNC: 5.5 MG/L (ref 0–20)
MICROALBUMIN/CREAT 24H UR: 5 MG/G CREATININE (ref 0–30)

## 2021-08-11 PROCEDURE — 82570 ASSAY OF URINE CREATININE: CPT | Performed by: FAMILY MEDICINE

## 2021-08-11 PROCEDURE — 82043 UR ALBUMIN QUANTITATIVE: CPT | Performed by: FAMILY MEDICINE

## 2021-08-11 PROCEDURE — 99214 OFFICE O/P EST MOD 30 MIN: CPT | Performed by: FAMILY MEDICINE

## 2021-08-11 RX ORDER — METFORMIN HYDROCHLORIDE 500 MG/1
500 TABLET, EXTENDED RELEASE ORAL
Qty: 30 TABLET | Refills: 5 | Status: SHIPPED | OUTPATIENT
Start: 2021-08-11 | End: 2022-01-13 | Stop reason: SDUPTHER

## 2021-08-11 RX ORDER — ATORVASTATIN CALCIUM 10 MG/1
10 TABLET, FILM COATED ORAL DAILY
Qty: 30 TABLET | Refills: 5 | Status: SHIPPED | OUTPATIENT
Start: 2021-08-11 | End: 2022-01-13

## 2021-08-11 NOTE — ASSESSMENT & PLAN NOTE
Lab Results   Component Value Date    HGBA1C 7 3 (H) 08/04/2021   Elevated A1c  No current medications  Diabetes discussed will start low-dose metformin and atorvastatin

## 2021-08-11 NOTE — PATIENT INSTRUCTIONS
Start metformin 500 mg XR once daily with dinner  Start atorvastatin 10 mg daily with dinner  Repeat labs in 3 months with office visit after  Return colo guard  Get mammogram as scheduled

## 2021-08-11 NOTE — PROGRESS NOTES
[id number]         NAME: Virginia Simms is a 64 y o  female  : 1960    MRN: [id number]  DATE: 2021  TIME: 12:16 PM    Assessment and Plan   Type 2 diabetes mellitus without complication, without long-term current use of insulin ([facility] ) [E11 9]  1  Type 2 diabetes mellitus without complication, without long-term current use of insulin (HCC)  metFORMIN (GLUCOPHAGE-XR) 500 mg 24 hr tablet    atorvastatin (LIPITOR) 10 mg tablet    Hemoglobin A1C    Comprehensive metabolic panel    Lipid Panel with Direct LDL reflex    Microalbumin / creatinine urine ratio   2  Generalized anxiety disorder         Type 2 diabetes mellitus without complication, without long-term current use of insulin ([facility])    Lab Results   Component Value Date    HGBA1C 7 3 (H) 2021   Elevated A1c  No current medications  Diabetes discussed will start low-dose metformin and atorvastatin  Patient Instructions     Patient Instructions   Start metformin 500 mg XR once daily with dinner  Start atorvastatin 10 mg daily with dinner  Repeat labs in 3 months with office visit after  Return colo guard  Get mammogram as scheduled  Chief Complaint     Chief Complaint   Patient presents with    Follow-up     Lab results         History of Present Illness       Patient comes back in today to review blood work  A1c at 7 3  No current medications for diabetes previous no history of diabetes however patient does remember 1 time she was told she was prediabetic  Anxiety-generally stable with current meds  Review of Systems   Review of Systems   Constitutional: Negative for appetite change, chills, diaphoresis and fever  HENT: Negative for ear pain, rhinorrhea, sinus pressure and sore throat  Eyes: Negative for discharge, redness and itching  Respiratory: Negative for cough, shortness of breath and wheezing  Cardiovascular: Negative for chest pain and palpitations  Rapid or slow heart rate   Gastrointestinal: Negative for abdominal pain, diarrhea, nausea and vomiting  Psychiatric/Behavioral: Negative for behavioral problems, dysphoric mood and sleep disturbance  The patient is nervous/anxious            Current Medications       Current Outpatient Medications:     atorvastatin (LIPITOR) 10 mg tablet, Take 1 tablet (10 mg total) by mouth daily, Disp: 30 tablet, Rfl: 5    clonazePAM (KlonoPIN) 0 5 mg tablet, Take 1 tablet (0 5 mg total) by mouth 2 (two) times a day as needed for seizures, Disp: 30 tablet, Rfl: 0    diclofenac sodium (VOLTAREN) 1 %, Apply 2 g topically 4 (four) times a day, Disp: 1 Tube, Rfl: 0    estradiol (ESTRACE) 0 5 MG tablet, Take 1 tablet (0 5 mg total) by mouth daily, Disp: 30 tablet, Rfl: 1    metFORMIN (GLUCOPHAGE-XR) 500 mg 24 hr tablet, Take 1 tablet (500 mg total) by mouth daily with dinner, Disp: 30 tablet, Rfl: 5    Naproxen Sodium (ALEVE PO), Take by mouth, Disp: , Rfl:     Current Allergies     Allergies as of 08/11/2021    (No Known Allergies)            The following portions of the patient's history were reviewed and updated as appropriate: allergies, current medications, past family history, past medical history, past social history, past surgical history and problem list      Past Medical History:   Diagnosis Date    Acute pain of left shoulder 9/3/2019    Depression     Impingement syndrome of left shoulder 9/3/2019    Moderate protein-calorie malnutrition ([address] ) 9/26/2018    Psychiatric disorder     Rib pain on left side 9/12/2019    Severe episode of recurrent major depressive disorder, without psychotic features ([address] ) 9/21/2018    Suicidal ideations 9/21/2018       Past Surgical History:   Procedure Laterality Date    FL INJECTION LEFT SHOULDER (ARTHROGRAM)  9/11/2019    HYSTERECTOMY         Family History   Problem Relation Age of Onset    Cancer Father     Cancer Sister          Medications have been verified  Objective   /72 (BP Location: Left arm, Patient Position: Sitting, Cuff Size: Standard)   Pulse 68   Temp 97 9 °F (36 6 °C) (Oral)   Ht 5' 6" (1 676 m)   Wt 63 kg (138 lb 12 8 oz)   SpO2 97%   BMI 22 40 kg/m²        Physical Exam     Physical Exam  Vitals reviewed  Constitutional:       General: She is not in acute distress  Appearance: She is well-developed  HENT:      Head: Normocephalic and atraumatic  Right Ear: Tympanic membrane and external ear normal  No drainage  Left Ear: Tympanic membrane normal  No drainage  Mouth/Throat:      Pharynx: No oropharyngeal exudate  Eyes:      General:         Right eye: No discharge  Left eye: No discharge  Conjunctiva/sclera: Conjunctivae normal    Neck:      Thyroid: No thyromegaly  Cardiovascular:      Rate and Rhythm: Normal rate and regular rhythm  Pulses: no weak pulses          Dorsalis pedis pulses are 2+ on the right side and 2+ on the left side  Posterior tibial pulses are 2+ on the right side and 2+ on the left side  Heart sounds: Normal heart sounds  Pulmonary:      Effort: Pulmonary effort is normal  No respiratory distress  Breath sounds: No wheezing or rales  Musculoskeletal:      Cervical back: Normal range of motion and neck supple  Right lower leg: No edema  Left lower leg: No edema  Feet:      Right foot:      Skin integrity: No ulcer, skin breakdown, erythema, warmth, callus or dry skin  Left foot:      Skin integrity: No ulcer, skin breakdown, erythema, warmth, callus or dry skin  Lymphadenopathy:      Cervical: No cervical adenopathy  Patient's shoes and socks removed  Right Foot/Ankle   Right Foot Inspection  Skin Exam: skin normal and skin intact no dry skin, no warmth, no callus, no erythema, no maceration, no abnormal color, no pre-ulcer, no ulcer and no callus                            Sensory   Vibration: intact    Monofilament testing: intact  Vascular  Capillary refills: < 3 seconds  The right DP pulse is 2+  The right PT pulse is 2+  Left Foot/Ankle  Left Foot Inspection  Skin Exam: skin normal and skin intactno dry skin, no warmth, no erythema, no maceration, normal color, no pre-ulcer, no ulcer and no callus                                         Sensory   Vibration: intact    Monofilament: intact  Vascular  Capillary refills: < 3 seconds  The left DP pulse is 2+  The left PT pulse is 2+  Assign Risk Category:  No deformity present; No loss of protective sensation; No weak pulses       Risk: 0        I have spent 25 minutes with Patient  today in which greater than 50% of this time was spent in counseling/coordination of care regarding Prognosis, Intructions for management and Impressions

## 2021-08-12 ENCOUNTER — TELEPHONE (OUTPATIENT)
Dept: FAMILY MEDICINE CLINIC | Facility: CLINIC | Age: 61
End: 2021-08-12

## 2021-08-12 ENCOUNTER — TELEPHONE (OUTPATIENT)
Dept: ADMINISTRATIVE | Facility: OTHER | Age: 61
End: 2021-08-12

## 2021-08-12 NOTE — LETTER
Diabetic Eye Exam Form    Date Requested: 21  Patient: Charlotte Osborne  Patient : 1960   Referring Provider: Zoila Cage MD    Dilated Retinal Exam, Optomap-Iris Exam, or Fundus Photography Done         Yes ([place] one above)         No     Date of Diabetic Eye Exam ______________________________  Left Eye      Exam did show retinopathy    Exam did not show retinopathy         Mild       Moderate       None       Proliferative       Severe     Right Eye     Exam did show retinopathy    Exam did not show retinopathy         Mild       Moderate       None       Proliferative       Severe     Comments __________________________________________________________    Practice Providing Exam ______________________________________________    Exam Performed By (print name) _______________________________________      Provider Signature ___________________________________________________      These reports are needed for  compliance    Please fax this completed form and a copy of the Diabetic Eye Exam report to our office located at [place] [address] as soon as possible to [phone] rafy Dickey Terrance: Phone [phone]    We thank you for your assistance in treating our mutual patient

## 2021-08-12 NOTE — TELEPHONE ENCOUNTER
----- Message from [place] sent at 8/12/2021 11:13 AM EDT -----  Regarding: Care Gap Request- DM EYE EXAM  08/12/21 11:13 AM    Hello, our patient attached above has had Diabetic Eye Exam completed/performed  Please assist in updating the patient chart by making an External outreach to [address] [place] [address] located in    The date of service is WITHIN 6 MONTHS    Thank you,  Oriana Zuniga  [facility]

## 2021-08-12 NOTE — TELEPHONE ENCOUNTER
Upon review of the In Basket request and the patient's chart, initial outreach has been made via fax, please see Contacts section for details       Thank you  Gopal Chisholm

## 2021-08-12 NOTE — TELEPHONE ENCOUNTER
----- Message from Charles Rush MD sent at 8/12/2021 12:54 PM EDT -----  Call patient with lab result-no protein in urine- coyult

## 2021-08-12 NOTE — LETTER
Diabetic Eye Exam Form 2nd Request!    Date Requested: 21  Patient: Nikole Ospina  Patient : 1960   Referring Provider: Jean-Paul Ignacio MD    Dilated Retinal Exam, Optomap-Iris Exam, or Fundus Photography Done         Yes ([place] one above)         No     Date of Diabetic Eye Exam ______________________________  Left Eye      Exam did show retinopathy    Exam did not show retinopathy         Mild       Moderate       None       Proliferative       Severe     Right Eye     Exam did show retinopathy    Exam did not show retinopathy         Mild       Moderate       None       Proliferative       Severe     Comments __________________________________________________________    Practice Providing Exam ______________________________________________    Exam Performed By (print name) _______________________________________      Provider Signature ___________________________________________________      These reports are needed for  compliance    Please fax this completed form and a copy of the Diabetic Eye Exam report to our office located at [place] [address] as soon as possible to [phone] rafy Reita Messenger: Phone [phone]    We thank you for your assistance in treating our mutual patient

## 2021-08-17 NOTE — TELEPHONE ENCOUNTER
Upon review of the In Basket request we were able to locate, review, and update the patient chart as requested for Diabetic Eye Exam     Any additional questions or concerns should be emailed to the Practice Liaisons via [email or web address] email, please do not reply via In Basket      Thank you  Ruchi Gambino

## 2021-08-17 NOTE — TELEPHONE ENCOUNTER
As a follow-up, a second attempt has been made for outreach via fax, please see Contacts section for details      Thank you  Patrick Horta

## 2021-08-23 ENCOUNTER — TELEPHONE (OUTPATIENT)
Dept: FAMILY MEDICINE CLINIC | Facility: CLINIC | Age: 61
End: 2021-08-23

## 2021-09-02 ENCOUNTER — HOSPITAL ENCOUNTER (OUTPATIENT)
Dept: MAMMOGRAPHY | Facility: IMAGING CENTER | Age: 61
Discharge: HOME | End: 2021-09-02
Payer: COMMERCIAL

## 2021-09-02 VITALS — WEIGHT: 138 LBS | BODY MASS INDEX: 22.18 KG/M2 | HEIGHT: 66 IN

## 2021-09-02 DIAGNOSIS — Z12.31 ENCOUNTER FOR SCREENING MAMMOGRAM FOR MALIGNANT NEOPLASM OF BREAST: ICD-10-CM

## 2021-09-02 PROCEDURE — 77063 BREAST TOMOSYNTHESIS BI: CPT

## 2021-09-02 PROCEDURE — 77067 SCR MAMMO BI INCL CAD: CPT

## 2021-09-21 ENCOUNTER — APPOINTMENT (OUTPATIENT)
Dept: URGENT CARE | Facility: CLINIC | Age: 61
End: 2021-09-21

## 2021-09-21 DIAGNOSIS — Z02.1 PRE-EMPLOYMENT HEALTH SCREENING EXAMINATION: Primary | ICD-10-CM

## 2021-09-21 LAB — RUBV IGG SERPL IA-ACNC: >175 IU/ML

## 2021-09-21 PROCEDURE — 86735 MUMPS ANTIBODY: CPT

## 2021-09-21 PROCEDURE — 86765 RUBEOLA ANTIBODY: CPT

## 2021-09-21 PROCEDURE — 86762 RUBELLA ANTIBODY: CPT

## 2021-09-21 PROCEDURE — 86480 TB TEST CELL IMMUN MEASURE: CPT

## 2021-09-21 PROCEDURE — 86787 VARICELLA-ZOSTER ANTIBODY: CPT

## 2021-09-23 LAB
GAMMA INTERFERON BACKGROUND BLD IA-ACNC: 0.04 IU/ML
M TB IFN-G BLD-IMP: NEGATIVE
M TB IFN-G CD4+ BCKGRND COR BLD-ACNC: 0 IU/ML
M TB IFN-G CD4+ BCKGRND COR BLD-ACNC: 0 IU/ML
MEV IGG SER QL: NORMAL
MITOGEN IGNF BCKGRD COR BLD-ACNC: >10 IU/ML
MUV IGG SER QL: NORMAL
VZV IGG SER IA-ACNC: NORMAL

## 2021-09-30 DIAGNOSIS — F41.1 GENERALIZED ANXIETY DISORDER: Chronic | ICD-10-CM

## 2021-09-30 RX ORDER — CLONAZEPAM 0.5 MG/1
0.5 TABLET ORAL 2 TIMES DAILY PRN
Qty: 30 TABLET | Refills: 0 | Status: SHIPPED | OUTPATIENT
Start: 2021-09-30 | End: 2021-12-08 | Stop reason: SDUPTHER

## 2021-10-12 ENCOUNTER — OFFICE VISIT (OUTPATIENT)
Dept: URGENT CARE | Facility: CLINIC | Age: 61
End: 2021-10-12
Payer: COMMERCIAL

## 2021-10-12 VITALS
TEMPERATURE: 97 F | DIASTOLIC BLOOD PRESSURE: 73 MMHG | HEART RATE: 76 BPM | SYSTOLIC BLOOD PRESSURE: 144 MMHG | WEIGHT: 135 LBS | OXYGEN SATURATION: 99 % | BODY MASS INDEX: 21.69 KG/M2 | RESPIRATION RATE: 20 BRPM | HEIGHT: 66 IN

## 2021-10-12 DIAGNOSIS — M54.50 ACUTE LEFT-SIDED LOW BACK PAIN WITHOUT SCIATICA: Primary | ICD-10-CM

## 2021-10-12 PROCEDURE — G0382 LEV 3 HOSP TYPE B ED VISIT: HCPCS | Performed by: PREVENTIVE MEDICINE

## 2021-10-12 RX ORDER — KETOROLAC TROMETHAMINE 30 MG/ML
30 INJECTION, SOLUTION INTRAMUSCULAR; INTRAVENOUS ONCE
Status: COMPLETED | OUTPATIENT
Start: 2021-10-12 | End: 2021-10-12

## 2021-10-12 RX ORDER — PREDNISONE 50 MG/1
50 TABLET ORAL DAILY
Qty: 5 TABLET | Refills: 0 | Status: SHIPPED | OUTPATIENT
Start: 2021-10-12 | End: 2021-10-17

## 2021-10-12 RX ORDER — METHOCARBAMOL 500 MG/1
1000 TABLET, FILM COATED ORAL
Qty: 20 TABLET | Refills: 0 | Status: SHIPPED | OUTPATIENT
Start: 2021-10-12 | End: 2022-01-13

## 2021-10-12 RX ORDER — BACLOFEN 10 MG/1
10 TABLET ORAL 3 TIMES DAILY
Qty: 30 TABLET | Refills: 0 | Status: SHIPPED | OUTPATIENT
Start: 2021-10-12 | End: 2022-01-13

## 2021-10-12 RX ADMIN — KETOROLAC TROMETHAMINE 30 MG: 30 INJECTION, SOLUTION INTRAMUSCULAR; INTRAVENOUS at 13:44

## 2021-12-08 DIAGNOSIS — E89.41 HOT FLASHES DUE TO SURGICAL MENOPAUSE: ICD-10-CM

## 2021-12-08 DIAGNOSIS — F41.1 GENERALIZED ANXIETY DISORDER: Chronic | ICD-10-CM

## 2021-12-08 RX ORDER — CLONAZEPAM 0.5 MG/1
0.5 TABLET ORAL 2 TIMES DAILY PRN
Qty: 30 TABLET | Refills: 0 | Status: SHIPPED | OUTPATIENT
Start: 2021-12-08 | End: 2022-03-10 | Stop reason: SDUPTHER

## 2021-12-08 RX ORDER — ESTRADIOL 0.5 MG/1
0.5 TABLET ORAL DAILY
Qty: 30 TABLET | Refills: 0 | Status: SHIPPED | OUTPATIENT
Start: 2021-12-08 | End: 2022-03-17 | Stop reason: SDUPTHER

## 2021-12-21 ENCOUNTER — TELEPHONE (OUTPATIENT)
Dept: FAMILY MEDICINE CLINIC | Facility: CLINIC | Age: 61
End: 2021-12-21

## 2022-01-13 ENCOUNTER — OFFICE VISIT (OUTPATIENT)
Dept: FAMILY MEDICINE CLINIC | Facility: CLINIC | Age: 62
End: 2022-01-13
Payer: COMMERCIAL

## 2022-01-13 VITALS
DIASTOLIC BLOOD PRESSURE: 80 MMHG | WEIGHT: 142.8 LBS | OXYGEN SATURATION: 98 % | HEART RATE: 70 BPM | BODY MASS INDEX: 23.05 KG/M2 | SYSTOLIC BLOOD PRESSURE: 122 MMHG

## 2022-01-13 DIAGNOSIS — R10.84 GENERALIZED ABDOMINAL PAIN: ICD-10-CM

## 2022-01-13 DIAGNOSIS — F41.1 GENERALIZED ANXIETY DISORDER: Chronic | ICD-10-CM

## 2022-01-13 DIAGNOSIS — E11.9 TYPE 2 DIABETES MELLITUS WITHOUT COMPLICATION, WITHOUT LONG-TERM CURRENT USE OF INSULIN (HCC): Primary | ICD-10-CM

## 2022-01-13 DIAGNOSIS — R13.14 PHARYNGOESOPHAGEAL DYSPHAGIA: ICD-10-CM

## 2022-01-13 PROCEDURE — 99214 OFFICE O/P EST MOD 30 MIN: CPT | Performed by: FAMILY MEDICINE

## 2022-01-13 RX ORDER — METFORMIN HYDROCHLORIDE 500 MG/1
1000 TABLET, EXTENDED RELEASE ORAL
Qty: 60 TABLET | Refills: 2 | Status: SHIPPED | OUTPATIENT
Start: 2022-01-13 | End: 2022-03-10

## 2022-01-13 NOTE — PROGRESS NOTES
[id number]         NAME: Preston Ayala is a 63 y o  female  : 1960    MRN: [id number]  DATE: 2022  TIME: 12:17 PM    Assessment and Plan   Type 2 diabetes mellitus without complication, without long-term current use of insulin ([facility] [address] ) [E11 9]  1  Type 2 diabetes mellitus without complication, without long-term current use of insulin ([facility])  Comprehensive metabolic panel    Lipid Panel with Direct LDL reflex    Microalbumin / creatinine urine ratio    metFORMIN (GLUCOPHAGE-XR) 500 mg 24 hr tablet   2  Generalized anxiety disorder  TSH, 3rd generation   3  Generalized abdominal pain  Comprehensive metabolic panel    CBC and differential    TSH, 3rd generation    Ambulatory referral to Gastroenterology    Food Allergy Profile   4  Pharyngoesophageal dysphagia  Ambulatory referral to Gastroenterology       Type 2 diabetes mellitus without complication, without long-term current use of insulin ([facility] [address] )    Lab Results   Component Value Date    HGBA1C 7 3 (H) 2021   A1c in the office today is 7 8  Has been inconsistent with metformin  Will increase to metformin XR 1000 mg daily  Patient will try to improve her diet  Repeat labs in 3 months  Unclear whether abdominal distension could be related to any diabetic gastroparesis  Patient Instructions     Patient Instructions   Get labs as ordered  Due to high A1c we need to increase her metformin to 2 tablets daily  Please take this every day  Due to general bloating and the trouble swallowing at times I would advise GI consultation for possible EGD  Continue clonazepam on an as-needed basis  My staff will call with labs when they come in  Most likely diagnosis on the abdominal bloating is irritable bowel syndrome  You can continue to use gas decreasing up over-the-counter meds            Chief Complaint     Chief Complaint   Patient presents with    Bloated     ? food allergies         History of Present Illness       Medical management-patient has not gotten recent labs  Medications reviewed  1) diabetes-patient been inconsistent with her metformin  A1c in the office today is 7 8   2) anxiety-more acute anxiety with minimal use of clonazepam   3) abdominal pain with bloating-this has been a ongoing issue  She gets abdominal distension  Has been trying to keep a food diary to see if this certain foods  No fevers reported  No vomiting  Bowel movements are intermittent as far as formed and loose  Review of Systems   Review of Systems   Constitutional: Negative for activity change, appetite change, diaphoresis and fatigue  Respiratory: Negative for cough, chest tightness, shortness of breath and wheezing  Cardiovascular: Negative for chest pain, palpitations and leg swelling  Fast or slow heart rate   Gastrointestinal: Positive for abdominal distention, abdominal pain and vomiting  Negative for blood in stool, constipation, diarrhea and nausea  Genitourinary: Negative for difficulty urinating, dysuria, frequency and hematuria  Musculoskeletal: Negative for arthralgias, gait problem, joint swelling and myalgias  Neurological: Negative for dizziness, light-headedness and headaches  Psychiatric/Behavioral: Negative for agitation, confusion, dysphoric mood and sleep disturbance  The patient is not nervous/anxious            Current Medications       Current Outpatient Medications:     clonazePAM (KlonoPIN) 0 5 mg tablet, Take 1 tablet (0 5 mg total) by mouth 2 (two) times a day as needed for seizures, Disp: 30 tablet, Rfl: 0    estradiol (ESTRACE) 0 5 MG tablet, Take 1 tablet (0 5 mg total) by mouth daily, Disp: 30 tablet, Rfl: 0    metFORMIN (GLUCOPHAGE-XR) 500 mg 24 hr tablet, Take 2 tablets (1,000 mg total) by mouth daily with dinner, Disp: 60 tablet, Rfl: 2    Current Allergies     Allergies as of 01/13/2022    (No Known Allergies)            The following portions of the patient's history were reviewed and updated as appropriate: allergies, current medications, past family history, past medical history, past social history, past surgical history and problem list      Past Medical History:   Diagnosis Date    Acute pain of left shoulder 9/3/2019    Depression     Impingement syndrome of left shoulder 9/3/2019    Moderate protein-calorie malnutrition ([facility] [address] ) 9/26/2018    Psychiatric disorder     Rib pain on left side 9/12/2019    Severe episode of recurrent major depressive disorder, without psychotic features ([facility] [address] ) 9/21/2018    Suicidal ideations 9/21/2018       Past Surgical History:   Procedure Laterality Date    FL INJECTION LEFT SHOULDER (ARTHROGRAM)  9/11/2019    HYSTERECTOMY      age 38    OOPHORECTOMY         Family History   Problem Relation Age of Onset    No Known Problems Mother     Cancer Father         throat    No Known Problems Sister     Cancer Maternal Grandmother         unknown primary or age of onset    No Known Problems Maternal Grandfather     Cancer Paternal Grandmother         unknown primary or age of onset    No Known Problems Paternal Grandfather     No Known Problems Sister     No Known Problems Sister     Ovarian cancer Maternal Aunt         late in life onset    Breast cancer Maternal Aunt         late in life onset    Cancer Maternal Aunt         unknown primary    Cancer Maternal Uncle     Cancer Maternal Uncle     Cancer Maternal Uncle     No Known Problems Maternal Uncle     No Known Problems Paternal Aunt          Medications have been verified  Objective   /80 (BP Location: Right arm, Patient Position: Sitting, Cuff Size: Standard)   Pulse 70   Wt 64 8 kg (142 lb 12 8 oz)   SpO2 98%   BMI 23 05 kg/m²        Physical Exam     Physical Exam  Vitals reviewed  Constitutional:       General: She is not in acute distress  Appearance: She is well-developed  She is not diaphoretic     HENT:      Head: Normocephalic and atraumatic  Right Ear: Tympanic membrane, ear canal and external ear normal       Left Ear: Tympanic membrane, ear canal and external ear normal       Nose: No mucosal edema or rhinorrhea  Right Sinus: No maxillary sinus tenderness  Left Sinus: No maxillary sinus tenderness  Mouth/Throat:      Mouth: Mucous membranes are not pale and not dry  Dentition: Normal dentition  Pharynx: Uvula midline  No oropharyngeal exudate  Eyes:      General:         Right eye: No discharge  Left eye: No discharge  Pupils: Pupils are equal, round, and reactive to light  Neck:      Thyroid: No thyromegaly  Cardiovascular:      Rate and Rhythm: Normal rate and regular rhythm  Heart sounds: Normal heart sounds  No murmur heard  Pulmonary:      Effort: Pulmonary effort is normal  No respiratory distress  Breath sounds: Normal breath sounds  No wheezing or rales  Abdominal:      General: There is no distension  Palpations: Abdomen is soft  There is no mass  Tenderness: There is no abdominal tenderness  There is no guarding  Musculoskeletal:      Cervical back: Normal range of motion and neck supple  Right lower leg: No edema  Left lower leg: No edema  Lymphadenopathy:      Cervical: No cervical adenopathy  Neurological:      Mental Status: She is alert and oriented to person, place, and time     Psychiatric:         Mood and Affect: Mood normal          Behavior: Behavior normal

## 2022-01-13 NOTE — PATIENT INSTRUCTIONS
Get labs as ordered  Due to high A1c we need to increase her metformin to 2 tablets daily  Please take this every day  Due to general bloating and the trouble swallowing at times I would advise GI consultation for possible EGD  Continue clonazepam on an as-needed basis  My staff will call with labs when they come in  Most likely diagnosis on the abdominal bloating is irritable bowel syndrome  You can continue to use gas decreasing up over-the-counter meds

## 2022-01-13 NOTE — ASSESSMENT & PLAN NOTE
Lab Results   Component Value Date    HGBA1C 7 3 (H) 08/04/2021   A1c in the office today is 7 8  Has been inconsistent with metformin  Will increase to metformin XR 1000 mg daily  Patient will try to improve her diet  Repeat labs in 3 months  Unclear whether abdominal distension could be related to any diabetic gastroparesis

## 2022-02-10 ENCOUNTER — OFFICE VISIT (OUTPATIENT)
Dept: GASTROENTEROLOGY | Facility: CLINIC | Age: 62
End: 2022-02-10
Payer: COMMERCIAL

## 2022-02-10 ENCOUNTER — TELEPHONE (OUTPATIENT)
Dept: GASTROENTEROLOGY | Facility: CLINIC | Age: 62
End: 2022-02-10

## 2022-02-10 VITALS
BODY MASS INDEX: 22.5 KG/M2 | SYSTOLIC BLOOD PRESSURE: 128 MMHG | DIASTOLIC BLOOD PRESSURE: 78 MMHG | WEIGHT: 140 LBS | HEIGHT: 66 IN

## 2022-02-10 DIAGNOSIS — R13.14 PHARYNGOESOPHAGEAL DYSPHAGIA: ICD-10-CM

## 2022-02-10 DIAGNOSIS — K21.9 GASTROESOPHAGEAL REFLUX DISEASE, UNSPECIFIED WHETHER ESOPHAGITIS PRESENT: Primary | ICD-10-CM

## 2022-02-10 DIAGNOSIS — R13.10 DYSPHAGIA, UNSPECIFIED TYPE: ICD-10-CM

## 2022-02-10 DIAGNOSIS — R14.0 ABDOMINAL BLOATING: ICD-10-CM

## 2022-02-10 DIAGNOSIS — Z12.11 SCREENING FOR COLON CANCER: ICD-10-CM

## 2022-02-10 PROCEDURE — 99203 OFFICE O/P NEW LOW 30 MIN: CPT | Performed by: NURSE PRACTITIONER

## 2022-02-10 NOTE — PROGRESS NOTES
[facility] - Outpatient Follow-up Note  Sarah Potter 63 y o  female MRN: [id number]  Encounter: [id number]    ASSESSMENT AND PLAN:      1  Gastroesophageal reflux disease, unspecified whether esophagitis present  2  Dysphagia, unspecified type  Patient states that she does have periodic difficulty swallowing both solids and liquids  States that she feels like her food is getting stuck in her mid chest   She also states that she feels as if she has increased bile and acid reflux in her esophagus as well  Consider GERD, functional dyspepsia, mechanical versus obstruction, Schatzki's ring    - schedule EGD at [facility])  - small frequent meals  - decrease caffeine  - food diary, noting trigger foods  - Ambulatory referral to Gastroenterology    3  Abdominal bloating  4  Abdominal pain, generalized  Patient states her abdominal pain on both left and right sides have been there a very long times well  States she feels the discomfort more in her lower abdomen  Sharp at times and pain comes and goes  States she feels like her abdominal area is very bloated at times  If EGD is negative will consider gastric emptying scan and or breath test to rule out gastroparesis or SIBO  5  Screening for colon cancer  Per patient's EMR and in discussion, she did Cologuard 08/21/2021 which was negative  She does state that she believes she has quite a bit of cancer in her family but is not sure of colon cancer  Followup Appointment:  2-3 months  ______________________________________________________________________    Chief Complaint   Patient presents with    Difficulty Swallowing    Gas    Bloated     HPI:  70-year-old female recent diabetic diagnosis presents with stomach pain, increased gas, bloating  Patient states she has had the symptoms for a very long time she states could be 20 years    She has tried Gas-X, Tums, was taking Prevacid at 1 point however nothing seem to really help her symptoms  Patient states that she is also having difficulty swallowing she feels like food both solids and liquids get stuck in her mid chest   She denies nausea or vomiting  She does states she does have some reflux symptoms  She states she only feels the mucus backing up in her esophagus at times  She notices that also around when she is having difficulty swallowing  She states she does have some abdominal discomfort she describes it as sharp lower abdomen pain and increased bloating that radiates from left to right and comes and goes  She states she is having normal bowel movements daily  Denies hematochezia or melena  She did Cologuard 08/21/2021 which was negative  She states she believes she has a lot cancer in her family however not sure if possibly an uncle had colon cancer  She believes her father had esophageal cancer  Her recent lab work noted an A1C of 7 3  She does have some lab work requested by her PCP that is due at this time  She states she will be going in having it drawn  Will have to keep an eye open for those results  CBC, CMP, TSH  She states she is an every day 3 cigarette per day smoker at this time  She is trying diligently to quit  She states occasional ETOH use        Historical Information   Past Medical History:   Diagnosis Date    Acute pain of left shoulder 9/3/2019    Depression     Impingement syndrome of left shoulder 9/3/2019    Moderate protein-calorie malnutrition ([address] ) 9/26/2018    Psychiatric disorder     Rib pain on left side 9/12/2019    Severe episode of recurrent major depressive disorder, without psychotic features ([address] ) 9/21/2018    Suicidal ideations 9/21/2018     Past Surgical History:   Procedure Laterality Date    FL INJECTION LEFT SHOULDER (ARTHROGRAM)  9/11/2019    HYSTERECTOMY      age 38    OOPHORECTOMY       Social History     Substance and Sexual Activity   Alcohol Use Yes    Alcohol/week: 1 0 standard drink    Types: 1 Glasses of wine per week    Comment: "sometimes"     Social History     Substance and Sexual Activity   Drug Use Not Currently    Types: Marijuana     Social History     Tobacco Use   Smoking Status Current Every Day Smoker    Packs/day: 0 00   Smokeless Tobacco Never Used   Tobacco Comment    1 cigarette every couple days     Family History   Problem Relation Age of Onset    No Known Problems Mother     Cancer Father         throat    No Known Problems Sister     Cancer Maternal Grandmother         unknown primary or age of onset    No Known Problems Maternal Grandfather     Cancer Paternal Grandmother         unknown primary or age of onset    No Known Problems Paternal Grandfather     No Known Problems Sister     No Known Problems Sister     Ovarian cancer Maternal Aunt         late in life onset    Breast cancer Maternal Aunt         late in life onset    Cancer Maternal Aunt         unknown primary    Cancer Maternal Uncle     Cancer Maternal Uncle     Cancer Maternal Uncle     No Known Problems Maternal Uncle     No Known Problems Paternal Aunt          Current Outpatient Medications:     clonazePAM (KlonoPIN) 0 5 mg tablet    estradiol (ESTRACE) 0 5 MG tablet    metFORMIN (GLUCOPHAGE-XR) 500 mg 24 hr tablet  No Known Allergies  Reviewed medications and allergies and updated as indicated    PHYSICAL EXAM:    Blood pressure 128/78, height 5' 6" (1 676 m), weight 63 5 kg (140 lb)  Body mass index is 22 6 kg/m²  General Appearance: NAD, cooperative, alert, normal exam  Eyes: Anicteric, PERRLA, EOMI  ENT:  Normocephalic, atraumatic, normal mucosa  Neck:  Supple, symmetrical, trachea midline  Resp:  Clear to auscultation bilaterally; no rales, rhonchi or wheezing; respirations unlabored   CV:  S1 S2, Regular rate and rhythm; no murmur, rub, or gallop    GI:  Soft, non-tender, non-distended; normal bowel sounds; no masses, no organomegaly   Rectal: Deferred  Musculoskeletal: No cyanosis, clubbing or edema  Normal ROM  Skin:  No jaundice, rashes, or lesions   Heme/Lymph: No palpable cervical lymphadenopathy  Psych: Normal affect, good eye contact  Neuro: No gross deficits, AAOx3    Lab Results:   Lab Results   Component Value Date    WBC 8 46 08/04/2021    HGB 14 2 08/04/2021    HCT 43 8 08/04/2021    MCV 94 08/04/2021     08/04/2021     Lab Results   Component Value Date    K 4 4 08/04/2021     (H) 08/04/2021    CO2 27 08/04/2021    BUN 8 08/04/2021    CREATININE 0 95 08/04/2021    GLUF 121 (H) 08/04/2021    CALCIUM 9 1 08/04/2021    CORRECTEDCA 9 6 08/04/2021    AST 12 08/04/2021    ALT 28 08/04/2021    ALKPHOS 59 08/04/2021    EGFR 66 08/04/2021     No results found for: IRON, TIBC, FERRITIN  No results found for: LIPASE    Radiology Results:   No results found

## 2022-02-10 NOTE — TELEPHONE ENCOUNTER
Scheduled date of EGD(as of today):  Physician performing EGD:  Location of EGD:  Instructions reviewed with patient by:  Clearances: 03/09/22 by Dr Hector varghese at [facility] and instructions reviewed by Jose Alvarado

## 2022-03-08 ENCOUNTER — LAB (OUTPATIENT)
Dept: LAB | Facility: HOSPITAL | Age: 62
End: 2022-03-08
Payer: COMMERCIAL

## 2022-03-08 LAB
CREAT UR-MCNC: 154 MG/DL
MICROALBUMIN UR-MCNC: 10 MG/L (ref 0–20)
MICROALBUMIN/CREAT 24H UR: 6 MG/G CREATININE (ref 0–30)

## 2022-03-08 PROCEDURE — 82570 ASSAY OF URINE CREATININE: CPT | Performed by: FAMILY MEDICINE

## 2022-03-08 PROCEDURE — 82043 UR ALBUMIN QUANTITATIVE: CPT | Performed by: FAMILY MEDICINE

## 2022-03-09 ENCOUNTER — HOSPITAL ENCOUNTER (OUTPATIENT)
Dept: GASTROENTEROLOGY | Facility: AMBULATORY SURGERY CENTER | Age: 62
Discharge: HOME | End: 2022-03-09
Payer: COMMERCIAL

## 2022-03-09 ENCOUNTER — ANESTHESIA (OUTPATIENT)
Dept: GASTROENTEROLOGY | Facility: AMBULATORY SURGERY CENTER | Age: 62
End: 2022-03-09

## 2022-03-09 ENCOUNTER — ANESTHESIA EVENT (OUTPATIENT)
Dept: GASTROENTEROLOGY | Facility: AMBULATORY SURGERY CENTER | Age: 62
End: 2022-03-09

## 2022-03-09 VITALS
TEMPERATURE: 97.3 F | SYSTOLIC BLOOD PRESSURE: 138 MMHG | DIASTOLIC BLOOD PRESSURE: 77 MMHG | OXYGEN SATURATION: 94 % | RESPIRATION RATE: 22 BRPM | HEART RATE: 64 BPM

## 2022-03-09 DIAGNOSIS — K21.9 GASTROESOPHAGEAL REFLUX DISEASE, UNSPECIFIED WHETHER ESOPHAGITIS PRESENT: ICD-10-CM

## 2022-03-09 DIAGNOSIS — R10.13 EPIGASTRIC ABDOMINAL PAIN: Primary | ICD-10-CM

## 2022-03-09 DIAGNOSIS — R14.0 ABDOMINAL BLOATING: ICD-10-CM

## 2022-03-09 DIAGNOSIS — R13.14 PHARYNGOESOPHAGEAL DYSPHAGIA: ICD-10-CM

## 2022-03-09 DIAGNOSIS — R13.10 DYSPHAGIA, UNSPECIFIED TYPE: ICD-10-CM

## 2022-03-09 PROBLEM — F32.A DEPRESSION: Status: ACTIVE | Noted: 2022-03-09

## 2022-03-09 PROBLEM — IMO0001 SMOKING: Status: ACTIVE | Noted: 2022-03-09

## 2022-03-09 PROBLEM — F17.200 SMOKING: Status: ACTIVE | Noted: 2022-03-09

## 2022-03-09 PROCEDURE — 43239 EGD BIOPSY SINGLE/MULTIPLE: CPT | Performed by: INTERNAL MEDICINE

## 2022-03-09 PROCEDURE — 88305 TISSUE EXAM BY PATHOLOGIST: CPT | Performed by: PATHOLOGY

## 2022-03-09 PROCEDURE — 43450 DILATE ESOPHAGUS 1/MULT PASS: CPT | Performed by: INTERNAL MEDICINE

## 2022-03-09 RX ORDER — PANTOPRAZOLE SODIUM 40 MG/1
40 TABLET, DELAYED RELEASE ORAL DAILY
Qty: 30 TABLET | Refills: 2 | Status: SHIPPED | OUTPATIENT
Start: 2022-03-09

## 2022-03-09 RX ORDER — LIDOCAINE HYDROCHLORIDE 10 MG/ML
INJECTION, SOLUTION EPIDURAL; INFILTRATION; INTRACAUDAL; PERINEURAL AS NEEDED
Status: DISCONTINUED | OUTPATIENT
Start: 2022-03-09 | End: 2022-03-09

## 2022-03-09 RX ORDER — SODIUM CHLORIDE, SODIUM LACTATE, POTASSIUM CHLORIDE, CALCIUM CHLORIDE 600; 310; 30; 20 MG/100ML; MG/100ML; MG/100ML; MG/100ML
50 INJECTION, SOLUTION INTRAVENOUS CONTINUOUS
Status: DISCONTINUED | OUTPATIENT
Start: 2022-03-09 | End: 2022-03-13 | Stop reason: HOSPADM

## 2022-03-09 RX ORDER — PROPOFOL 10 MG/ML
INJECTION, EMULSION INTRAVENOUS AS NEEDED
Status: DISCONTINUED | OUTPATIENT
Start: 2022-03-09 | End: 2022-03-09

## 2022-03-09 RX ADMIN — PROPOFOL 130 MG: 10 INJECTION, EMULSION INTRAVENOUS at 14:32

## 2022-03-09 RX ADMIN — PROPOFOL 40 MG: 10 INJECTION, EMULSION INTRAVENOUS at 14:33

## 2022-03-09 RX ADMIN — PROPOFOL 50 MG: 10 INJECTION, EMULSION INTRAVENOUS at 14:37

## 2022-03-09 RX ADMIN — LIDOCAINE HYDROCHLORIDE 70 MG: 10 INJECTION, SOLUTION EPIDURAL; INFILTRATION; INTRACAUDAL; PERINEURAL at 14:32

## 2022-03-09 RX ADMIN — PROPOFOL 50 MG: 10 INJECTION, EMULSION INTRAVENOUS at 14:35

## 2022-03-09 RX ADMIN — SODIUM CHLORIDE, SODIUM LACTATE, POTASSIUM CHLORIDE, CALCIUM CHLORIDE 50 ML/HR: 600; 310; 30; 20 INJECTION, SOLUTION INTRAVENOUS at 14:05

## 2022-03-09 NOTE — ANESTHESIA POSTPROCEDURE EVALUATION
Post-Op Assessment Note    CV Status:  Stable  Pain Score: 0    Pain management: adequate     Mental Status:  Sleepy   Hydration Status:  Stable   PONV Controlled:  None   Airway Patency:  Patent      Post Op Vitals Reviewed: Yes      Staff: CRNA, Anesthesiologist         No complications documented      BP   100/57   Temp     Pulse   63   Resp   16   SpO2   97% on RA

## 2022-03-09 NOTE — DISCHARGE INSTRUCTIONS
Esophagitis   WHAT YOU NEED TO KNOW:   Esophagitis is inflammation or irritation of the lining of the esophagus  DISCHARGE INSTRUCTIONS:   Call your local emergency number (911 in the [address] [facility]) for any of the following:   · You have any of the following signs of a heart attack:      ? Squeezing, pressure, or pain in your chest    ? You may  also have any of the following:     § Discomfort or pain in your back, neck, jaw, stomach, or arm    § Shortness of breath    § Nausea or vomiting    § Lightheadedness or a sudden cold sweat      Seek care immediately if:   · You feel like you have food stuck in your throat and you cannot cough it out  Call your doctor if:   · You have new or worsening symptoms, even after treatment  · You have questions or concerns about your condition or care  Medicines:   · Medicines  may be given to fight an infection or to control stomach acid  · Take your medicine as directed  Contact your healthcare provider if you think your medicine is not helping or if you have side effects  Tell him or her if you are allergic to any medicine  Keep a list of the medicines, vitamins, and herbs you take  Include the amounts, and when and why you take them  Bring the list or the pill bottles to follow-up visits  Carry your medicine list with you in case of an emergency  Manage or prevent esophagitis:   · Do not smoke  Nicotine and other chemicals in cigarettes and cigars can irritate and damage your esophagus  Ask your healthcare provider for information if you currently smoke and need help to quit  E-cigarettes or smokeless tobacco still contain nicotine  Talk to your healthcare provider before you use these products  · Do not drink alcohol  Alcohol can irritate your esophagus  Talk to your healthcare provider if you need help to stop drinking  · Limit or do not eat foods that can lead to esophagitis  Foods such as oranges and salsa can irritate your esophagus  Caffeine and chocolate can cause acid reflux  High-fat and fried foods make your stomach digest food more slowly  This increases the amount of stomach acid your esophagus is exposed to  Eat small meals, and drink water with your meals  Soft foods such as yogurt and applesauce may help soothe your throat  Do not eat for at least 3 hours before you go to bed  · Drink more liquid when you take pills  Drink a full glass of water when you take your pills  Ask your healthcare provider if you can take your pills at least an hour before you go to bed  · Prevent acid reflux  Do not bend over unless it is necessary  Acid may back up into your esophagus when you bend over  If possible, keep the head of your bed elevated while you sleep  This will help keep acid from backing up  Manage stress  Stress can make your symptoms worse or cause stomach acid to back up  · Keep batteries and similar objects out of the reach of children  Babies often put items in their mouths to explore them  Button batteries are easy to swallow and can cause serious damage  Keep the battery covers of electronic devices such as remote controls taped closed  Store all batteries and toxic materials where children cannot get to them  Use childproof locks to keep children away from dangerous materials  Follow up with your doctor as directed: Your doctor may refer you to a stomach specialist, allergist, or dietitian  You may need ongoing tests or treatment  Write down your questions so you remember to ask them during your visits  © Copyright [employer] 2022 Information is for End User's use only and may not be sold, redistributed or otherwise used for commercial purposes  All illustrations and images included in CareNotes® are the copyrighted property of [facility] [employer]  or Manuel Barakat   The above information is an  only  It is not intended as medical advice for individual conditions or treatments   Talk to your doctor, nurse or pharmacist before following any medical regimen to see if it is safe and effective for you  Esophageal Dilation   WHAT YOU NEED TO KNOW:   Esophageal dilation is a procedure to widen a narrow part of your esophagus  Your healthcare provider will use a dilator (inflatable balloon or another tool that expands) to make the area wider  He may also do an endoscopy before or during your esophageal dilation  During an endoscopy, your healthcare provider will use a scope to see inside your esophagus  DISCHARGE INSTRUCTIONS:   Medicines:   · Medicines  may be given to decrease stomach acid that can irritate your esophagus  · Take your medicine as directed  Contact your healthcare provider if you think your medicine is not helping or if you have side effects  Tell him if you are allergic to any medicine  Keep a list of the medicines, vitamins, and herbs you take  Include the amounts, and when and why you take them  Bring the list or the pill bottles to follow-up visits  Carry your medicine list with you in case of an emergency  Follow up with your healthcare provider as directed:  Write down your questions so you remember to ask them during your visits  Nutrition:  You may eat foods you normally eat  Chew your food well  Eat soft foods if you still have problems swallowing  Soft foods include applesauce, bananas, cooked cereal, cottage cheese, eggs, pudding, and yogurt  Ask for more information on what types of food to eat  Contact your healthcare provider if:   · You have a fever  · You feel very full or bloated  · You have more problems swallowing food  · You have nausea or are vomiting  · You have questions or concerns about your condition or care  Seek care immediately or call 911 if:   · You vomit blood  · You are not able to swallow any food  · You have a fast heartbeat, chest pain, or sudden trouble breathing  · Your abdomen suddenly becomes tender and hard      © Copyright [employer] 2018 Information is for [employer] use only and may not be sold, redistributed or otherwise used for commercial purposes  All illustrations and images included in CareNotes® are the copyrighted property of A [employer] A M [employer]  or Manuel Kelly  The above information is an  only  It is not intended as medical advice for individual conditions or treatments  Talk to your doctor, nurse or pharmacist before following any medical regimen to see if it is safe and effective for you  Pantoprazole (By mouth)   Pantoprazole (pan-TOE-pra-zole)  Treats gastroesophageal reflux disease (GERD), a damaged esophagus, and conditions that cause your stomach to make too much acid, including Zollinger-Alcala syndrome  This medicine is a proton pump inhibitor (PPI)  Brand Name(s): Protonix   There may be other brand names for this medicine  When This Medicine Should Not Be Used: This medicine is not right for everyone  Do not use it if you had an allergic reaction to pantoprazole or similar medicines  How to Use This Medicine:   Packet, Tablet, Delayed Release Tablet, Long Acting Tablet  · Your doctor will tell you how much medicine to use  Do not use more than directed  · Delayed-release tablet: Swallow the tablet whole  Do not crush, break, or chew it  · Delayed-release packet: Take the medicine mixed in apple juice or applesauce at least 30 minutes before a meal  It may also be given using a nasogastric tube when mixed in apple juice only  ? To prepare with applesauce:   § Mix the packet contents with 1 teaspoon of applesauce  Do not mix with water or other liquids or food  Do not divide the packet contents to make smaller doses  § Swallow the mixture within 10 minutes after you mix it  Do not chew or crush the granules  § Sip some water after you take the mixture to make sure you swallow all of the medicine  ?  To prepare with apple juice:   § Mix the packet contents with 1 teaspoon of apple juice in a small cup  Do not divide the packet contents to make smaller doses  § Stir for 5 seconds and drink the mixture immediately  Do not chew or crush the granules  § To make sure you get all of the medicine, add more apple juice to the cup  Drink it immediately  ? To prepare for a feeding tube:   § Pour the packet contents in a 2-ounce (60 milliliter [mL]) catheter-tip syringe  § Add 10 mL of apple juice to the syringe  Add the mixture to the tube  Gently tap or shake the barrel of the syringe to help empty it  § Add 10 mL of apple juice to the syringe and put it in the tube  Do this at least 2 times  There should be no granules left in the syringe  · This medicine should come with a Medication Guide  Ask your pharmacist for a copy if you do not have one  · Missed dose: Take a dose as soon as you remember  If it is almost time for your next dose, wait until then and take a regular dose  Do not take extra medicine to make up for a missed dose  · Store the medicine in a closed container at room temperature, away from heat, moisture, and direct light  Drugs and Foods to Avoid:   Ask your doctor or pharmacist before using any other medicine, including over-the-counter medicines, vitamins, and herbal products  · Do not use pantoprazole together with medicine that contains rilpivirine  · Some medicines can affect how pantoprazole works  Tell your doctor if you are using any of the following:   ? Ampicillin, atazanavir, dasatinib, digoxin, erlotinib, itraconazole, ketoconazole, methotrexate, mycophenolate mofetil, nelfinavir, nilotinib, saquinavir  ? Blood thinner (including warfarin)  ? Diuretic (water pill)  ? Iron supplements  Warnings While Using This Medicine:   · Tell your doctor if you are pregnant or breastfeeding, or if you have kidney disease, liver disease, lupus, or osteoporosis    · This medicine may cause the following problems:  ? Kidney problems, including acute tubulointerstitial nephritis  ? Increased risk of broken bones in the hip, wrist, or spine (more likely if used several times per day or longer than 1 year)  ? Lupus  ? Fundic gland polyps (abnormal growth in the upper part of your stomach)  · This medicine can cause diarrhea  Call your doctor if the diarrhea becomes severe, does not stop, or is bloody  Do not take any medicine to stop diarrhea until you have talked to your doctor  Diarrhea can occur 2 months or more after you stop taking this medicine  · Tell any doctor or dentist who treats you that you are using this medicine  This medicine may affect certain medical test results  · Your doctor will do lab tests at regular visits to check on the effects of this medicine  Keep all appointments  · Keep all medicine out of the reach of children  Never share your medicine with anyone  Possible Side Effects While Using This Medicine:   Call your doctor right away if you notice any of these side effects:  · Allergic reaction: Itching or hives, swelling in your face or hands, swelling or tingling in your mouth or throat, chest tightness, trouble breathing  · Blistering, peeling, red skin rash  · Fever, joint pain, swelling in your body, unusual weight gain, change in how much or how often you urinate, blood in the urine  · Joint pain, rash on your cheeks or arms that gets worse in the sun  · Seizures, dizziness, uneven heartbeat, muscle cramps or twitching  · Severe diarrhea, stomach cramp or pain, nausea, vomiting, loss of appetite, weight loss  · Unusual tiredness or weakness  If you notice these less serious side effects, talk with your doctor:   · Headache  If you notice other side effects that you think are caused by this medicine, tell your doctor  Call your doctor for medical advice about side effects   You may report side effects to FDA at 1-424-FDA-8690    © Copyright [employer] 2022 Information is for End User's use only and may not be sold, redistributed or otherwise used for commercial purposes  The above information is an  only  It is not intended as medical advice for individual conditions or treatments  Talk to your doctor, nurse or pharmacist before following any medical regimen to see if it is safe and effective for you  Upper Endoscopy   WHAT YOU NEED TO KNOW:   An upper endoscopy is also called an upper gastrointestinal (GI) endoscopy, or an esophagogastroduodenoscopy (EGD)  You may feel bloated, gassy, or have some abdominal discomfort after your procedure  Your throat may be sore for 24 to 36 hours  You may burp or pass gas from air that is still inside your body  DISCHARGE INSTRUCTIONS:   Call 911 for any of the following:   · You have sudden chest pain or trouble breathing  Seek care immediately if:   · You feel dizzy or faint  · You have trouble swallowing  · Your bowel movements are very dark or black  · Your abdomen is hard and firm and you have severe pain  · You vomit blood  Contact your healthcare provider if:   · You feel full or bloated and cannot burp or pass gas  · You have not had a bowel movement for 3 days after your procedure  · You have neck pain  · You have a fever or chills  · You have nausea or are vomiting  · You have a rash or hives  · You have questions or concerns about your endoscopy  Relieve a sore throat:  Suck on throat lozenges or crushed ice  Gargle with a small amount of warm salt water  Mix 1 teaspoon of salt and 1 cup of warm water to make salt water  Relieve gas and discomfort from bloating:  Lie on your right side with a heating pad on your abdomen  Take short walks to help pass gas  Eat small meals until bloating is relieved  Rest after your procedure: You have been given medicine to relax you  Do not  drive or make important decisions until the day after your procedure  Return to your normal activity as directed   You can usually return to work the day after your procedure  Follow up with your healthcare provider as directed:  Write down your questions so you remember to ask them during your visits  © 2017 [address] is for End User's use only and may not be sold, redistributed or otherwise used for commercial purposes  All illustrations and images included in CareNotes® are the copyrighted property of A D A M , Inc  or Adi Boyer  The above information is an  only  It is not intended as medical advice for individual conditions or treatments  Talk to your doctor, nurse or pharmacist before following any medical regimen to see if it is safe and effective for you

## 2022-03-09 NOTE — H&P
History and Physical - [facility]    Hank Saavedra 63 y o  female MRN: [id number]      HPI: Hank Saavedra is a 63y o  year old female who presents for abd pain dysphagia  REVIEW OF SYSTEMS: Per the HPI, and otherwise unremarkable      Historical Information     Past Medical History:   Diagnosis Date    Acute pain of left shoulder 9/3/2019    Depression     Impingement syndrome of left shoulder 9/3/2019    Moderate protein-calorie malnutrition ([facility] [address] ) 9/26/2018    Psychiatric disorder     Rib pain on left side 9/12/2019    Severe episode of recurrent major depressive disorder, without psychotic features ([facility] [address] ) 9/21/2018    Suicidal ideations 9/21/2018     Past Surgical History:   Procedure Laterality Date    FL INJECTION LEFT SHOULDER (ARTHROGRAM)  9/11/2019    HYSTERECTOMY      age 38    OOPHORECTOMY       Social History   Social History     Substance and Sexual Activity   Alcohol Use Yes    Alcohol/week: 1 0 standard drink    Types: 1 Glasses of wine per week    Comment: "sometimes"     Social History     Substance and Sexual Activity   Drug Use Not Currently    Types: Marijuana     Social History     Tobacco Use   Smoking Status Current Every Day Smoker    Packs/day: 0 00   Smokeless Tobacco Never Used   Tobacco Comment    1 cigarette every couple days     Family History   Problem Relation Age of Onset    No Known Problems Mother     Cancer Father         throat    No Known Problems Sister     Cancer Maternal Grandmother         unknown primary or age of onset    No Known Problems Maternal Grandfather     Cancer Paternal Grandmother         unknown primary or age of onset    No Known Problems Paternal Grandfather     No Known Problems Sister     No Known Problems Sister     Ovarian cancer Maternal Aunt         late in life onset    Breast cancer Maternal Aunt         late in life onset    Cancer Maternal Aunt         unknown primary    Cancer Maternal Uncle     Cancer Maternal Uncle     Cancer Maternal Uncle     No Known Problems Maternal Uncle     No Known Problems Paternal Aunt        Meds/Allergies       Current Outpatient Medications:     clonazePAM (KlonoPIN) 0 5 mg tablet    estradiol (ESTRACE) 0 5 MG tablet    metFORMIN (GLUCOPHAGE-XR) 500 mg 24 hr tablet    Current Facility-Administered Medications:     lactated ringers infusion, 50 mL/hr, Intravenous, Continuous, 50 mL/hr at 03/09/22 1405    No Known Allergies    Objective     /63   Pulse 60   Temp (!) 97 3 °F (36 3 °C) (Temporal)   Resp 20   SpO2 98%       PHYSICAL EXAM    Gen: NAD AAOx3  Head: Normocephalic, Atraumatic  CV: S1S2 RRR no m/r/g  CHEST: Clear b/l no c/r/w  ABD: soft, +BS NT/ND no masses  EXT: no edema      ASSESSMENT/PLAN:  This is a 63y o  year old female here for EGD/dilation, and she is stable and optimized for her procedure

## 2022-03-09 NOTE — ANESTHESIA PREPROCEDURE EVALUATION
Procedure:  EGD    Relevant Problems   ENDO   (+) Type 2 diabetes mellitus without complication, without long-term current use of insulin (HCC)      NEURO/PSYCH   (+) Depression   (+) Generalized anxiety disorder      PULMONARY   (+) Smoking        Physical Exam    Airway    Mallampati score: I  TM Distance: >3 FB  Neck ROM: full     Dental   upper dentures,     Cardiovascular  Cardiovascular exam normal    Pulmonary  Pulmonary exam normal     Other Findings        Anesthesia Plan  ASA Score- 2     Anesthesia Type- IV sedation with anesthesia with ASA Monitors  Additional Monitors:   Airway Plan:           Plan Factors-Exercise tolerance (METS): >4 METS  Chart reviewed  Patient summary reviewed  Patient is a current smoker  Patient smoked on day of surgery  Induction- intravenous  Postoperative Plan-     Informed Consent- Anesthetic plan and risks discussed with patient  I personally reviewed this patient with the CRNA  Discussed and agreed on the Anesthesia Plan with the CRNA  Karrie Nissen

## 2022-03-10 ENCOUNTER — OFFICE VISIT (OUTPATIENT)
Dept: FAMILY MEDICINE CLINIC | Facility: CLINIC | Age: 62
End: 2022-03-10
Payer: COMMERCIAL

## 2022-03-10 VITALS — SYSTOLIC BLOOD PRESSURE: 118 MMHG | DIASTOLIC BLOOD PRESSURE: 78 MMHG | HEART RATE: 68 BPM | OXYGEN SATURATION: 97 %

## 2022-03-10 DIAGNOSIS — E78.5 HYPERLIPIDEMIA ASSOCIATED WITH TYPE 2 DIABETES MELLITUS (HCC): ICD-10-CM

## 2022-03-10 DIAGNOSIS — E11.9 TYPE 2 DIABETES MELLITUS WITHOUT COMPLICATION, WITHOUT LONG-TERM CURRENT USE OF INSULIN (HCC): Primary | ICD-10-CM

## 2022-03-10 DIAGNOSIS — F41.1 GENERALIZED ANXIETY DISORDER: Chronic | ICD-10-CM

## 2022-03-10 DIAGNOSIS — E11.69 HYPERLIPIDEMIA ASSOCIATED WITH TYPE 2 DIABETES MELLITUS (HCC): ICD-10-CM

## 2022-03-10 PROCEDURE — 99214 OFFICE O/P EST MOD 30 MIN: CPT | Performed by: FAMILY MEDICINE

## 2022-03-10 RX ORDER — ATORVASTATIN CALCIUM 10 MG/1
10 TABLET, FILM COATED ORAL DAILY
Qty: 30 TABLET | Refills: 5 | Status: SHIPPED | OUTPATIENT
Start: 2022-03-10

## 2022-03-10 RX ORDER — METFORMIN HYDROCHLORIDE 750 MG/1
1500 TABLET, EXTENDED RELEASE ORAL
Qty: 60 TABLET | Refills: 2 | Status: SHIPPED | OUTPATIENT
Start: 2022-03-10 | End: 2022-09-06

## 2022-03-10 RX ORDER — METFORMIN HYDROCHLORIDE 750 MG/1
750 TABLET, EXTENDED RELEASE ORAL
Qty: 90 TABLET | Refills: 1 | Status: SHIPPED | OUTPATIENT
Start: 2022-03-10 | End: 2022-03-10 | Stop reason: CLARIF

## 2022-03-10 RX ORDER — CLONAZEPAM 0.5 MG/1
0.5 TABLET ORAL 2 TIMES DAILY PRN
Qty: 30 TABLET | Refills: 0 | Status: SHIPPED | OUTPATIENT
Start: 2022-03-10 | End: 2022-07-08 | Stop reason: SDUPTHER

## 2022-03-10 NOTE — PROGRESS NOTES
[id number]         NAME: Nancy Vargas is a 63 y o  female  : 1960    MRN: [id number]  DATE: March 10, 2022  TIME: 8:58 AM    Assessment and Plan   Type 2 diabetes mellitus without complication, without long-term current use of insulin ([facility] ) [E11 9]  1  Type 2 diabetes mellitus without complication, without long-term current use of insulin (HCC)  atorvastatin (LIPITOR) 10 mg tablet    metFORMIN (GLUCOPHAGE-XR) 750 mg 24 hr tablet    DISCONTINUED: metFORMIN (GLUCOPHAGE-XR) 750 mg 24 hr tablet   2  Hyperlipidemia associated with type 2 diabetes mellitus (HCC)  atorvastatin (LIPITOR) 10 mg tablet   3  Generalized anxiety disorder  clonazePAM (KlonoPIN) 0 5 mg tablet       No problem-specific Assessment & Plan notes found for this encounter  Patient Instructions     Patient Instructions     10% - bad control"> 10% - bad control,Hemoglobin A1c (HbA1c) greater than 10% indicating poor diabetic control,Haemoglobin A1c greater than 10% indicating poor diabetic control">   Diabetes Mellitus Type 2 in Adults, Ambulatory Care   GENERAL INFORMATION:   Diabetes mellitus type 2  is a disease that affects how your body uses glucose (sugar)  Insulin helps move sugar out of the blood so it can be used for energy  Normally, when the blood sugar level increases, the pancreas makes more insulin  Type 2 diabetes develops because either the body cannot make enough insulin, or it cannot use the insulin correctly  After many years, your pancreas may stop making insulin  Common symptoms include the following:   · More hunger or thirst than usual     · Frequent urination     · Weight loss without trying     · Blurred vision  Seek immediate care for the following symptoms:   · Severe abdominal pain, or pain that spreads to your back  You may also be vomiting      · Trouble staying awake or focusing    · Shaking or sweating    · Blurred or double vision    · Breath has a fruity, sweet smell    · Breathing is deep and labored, or rapid and shallow    · Heartbeat is fast and weak  Treatment for diabetes mellitus type 2  includes keeping your blood sugar at a normal level  You must eat the right foods, and exercise regularly  You may also need medicine if you cannot control your blood sugar level with nutrition and exercise  Manage diabetes mellitus type 2:   · Check your blood sugar level  You will be taught how to check a small drop of blood in a glucose monitor  Ask your healthcare provider when and how often to check during the day  Ask your healthcare provider what your blood sugar levels should be when you check them  · Keep track of carbohydrates (sugar and starchy foods)  Your blood sugar level can get too high if you eat too many carbohydrates  Your dietitian will help you plan meals and snacks that have the right amount of carbohydrates  · Eat low-fat foods  Some examples are skinless chicken and low-fat milk  · Eat less sodium (salt)  Some examples of high-sodium foods to limit are soy sauce, potato chips, and soup  Do not add salt to food you cook  Limit your use of table salt  · Eat high-fiber foods  Foods that are a good source of fiber include vegetables, whole grain bread, and beans  · Limit alcohol  Alcohol affects your blood sugar level and can make it harder to manage your diabetes  Women should limit alcohol to 1 drink a day  Men should limit alcohol to 2 drinks a day  A drink of alcohol is 12 ounces of beer, 5 ounces of wine, or 1½ ounces of liquor  · Get regular exercise  Exercise can help keep your blood sugar level steady, decrease your risk of heart disease, and help you lose weight  Exercise for at least 30 minutes, 5 days a week  Include muscle strengthening activities 2 days each week  Work with your healthcare provider to create an exercise plan  · Check your feet each day  for injuries or open sores   Ask your healthcare provider for activities you can do if you have an open sore  · Quit smoking  If you smoke, it is never too late to quit  Smoking can worsen the problems that may occur with diabetes  Ask your healthcare provider for information about how to stop smoking if you are having trouble quitting  · Ask about your weight:  Ask healthcare providers if you need to lose weight, and how much to lose  Ask them to help you with a weight loss program  Even a 10 to 15 pound weight loss can help you manage your blood sugar level  · Carry medical alert identification  Wear medical alert jewelry or carry a card that says you have diabetes  Ask your healthcare provider where to get these items  · Ask about vaccines  Diabetes puts you at risk of serious illness if you get the flu, pneumonia, or hepatitis  Ask your healthcare provider if you should get a flu, pneumonia, or hepatitis B vaccine, and when to get the vaccine  Follow up with your healthcare provider as directed:  Write down your questions so you remember to ask them during your visits  CARE AGREEMENT:   You have the right to help plan your care  Learn about your health condition and how it may be treated  Discuss treatment options with your caregivers to decide what care you want to receive  You always have the right to refuse treatment  The above information is an  only  It is not intended as medical advice for individual conditions or treatments  Talk to your doctor, nurse or pharmacist before following any medical regimen to see if it is safe and effective for you  © 2014 [address] is for End User's use only and may not be sold, redistributed or otherwise used for commercial purposes  All illustrations and images included in CareNotes® are the copyrighted property of [employer]  or Adi Boyer              Chief Complaint     Chief Complaint   Patient presents with    Follow-up     Review labs History of Present Illness       Patient comes in to follow-up  Recent labs reviewed  Medications reviewed  1) diabetes-A1c up slightly at 7 4%  2) reflux symptoms-recent EGD  Biopsies pending  3) anxiety-minimal use of clonazepam 1-2 per week  4) borderline hyperlipidemia-should be on a statin  Review of Systems   Review of Systems   Constitutional: Negative for activity change, appetite change, diaphoresis and fatigue  Respiratory: Negative for cough, chest tightness, shortness of breath and wheezing  Cardiovascular: Negative for chest pain, palpitations and leg swelling  Fast or slow heart rate   Gastrointestinal: Negative for abdominal pain, blood in stool, constipation, diarrhea, nausea and vomiting  Neurological: Negative for dizziness, light-headedness and headaches  Psychiatric/Behavioral: Negative for agitation, confusion, dysphoric mood and sleep disturbance  The patient is not nervous/anxious  Current Medications       Current Outpatient Medications:     atorvastatin (LIPITOR) 10 mg tablet, Take 1 tablet (10 mg total) by mouth daily, Disp: 30 tablet, Rfl: 5    clonazePAM (KlonoPIN) 0 5 mg tablet, Take 1 tablet (0 5 mg total) by mouth 2 (two) times a day as needed for seizures, Disp: 30 tablet, Rfl: 0    estradiol (ESTRACE) 0 5 MG tablet, Take 1 tablet (0 5 mg total) by mouth daily, Disp: 30 tablet, Rfl: 0    metFORMIN (GLUCOPHAGE-XR) 750 mg 24 hr tablet, Take 2 tablets (1,500 mg total) by mouth daily with breakfast, Disp: 60 tablet, Rfl: 2    pantoprazole (PROTONIX) 40 mg tablet, Take 1 tablet (40 mg total) by mouth daily, Disp: 30 tablet, Rfl: 2  No current facility-administered medications for this visit      Facility-Administered Medications Ordered in Other Visits:     lactated ringers infusion, 50 mL/hr, Intravenous, Continuous, Liz Corea MD, Stopped at 03/09/22 5313    Current Allergies     Allergies as of 03/10/2022    (No Known Allergies) The following portions of the patient's history were reviewed and updated as appropriate: allergies, current medications, past family history, past medical history, past social history, past surgical history and problem list      Past Medical History:   Diagnosis Date    Acute pain of left shoulder 9/3/2019    Depression     Impingement syndrome of left shoulder 9/3/2019    Moderate protein-calorie malnutrition ([facility] ) 9/26/2018    Psychiatric disorder     Rib pain on left side 9/12/2019    Severe episode of recurrent major depressive disorder, without psychotic features ([facility] ) 9/21/2018    Suicidal ideations 9/21/2018       Past Surgical History:   Procedure Laterality Date    FL INJECTION LEFT SHOULDER (ARTHROGRAM)  9/11/2019    HYSTERECTOMY      age 38    OOPHORECTOMY         Family History   Problem Relation Age of Onset    No Known Problems Mother     Cancer Father         throat    No Known Problems Sister     Cancer Maternal Grandmother         unknown primary or age of onset    No Known Problems Maternal Grandfather     Cancer Paternal Grandmother         unknown primary or age of onset    No Known Problems Paternal Grandfather     No Known Problems Sister     No Known Problems Sister     Ovarian cancer Maternal Aunt         late in life onset    Breast cancer Maternal Aunt         late in life onset    Cancer Maternal Aunt         unknown primary    Cancer Maternal Uncle     Cancer Maternal Uncle     Cancer Maternal Uncle     No Known Problems Maternal Uncle     No Known Problems Paternal Aunt          Medications have been verified  Objective   /78 (BP Location: Left arm, Patient Position: Sitting, Cuff Size: Standard)   Pulse 68   SpO2 97%        Physical Exam     Physical Exam  Vitals reviewed  Constitutional:       General: She is not in acute distress  Appearance: She is well-developed  HENT:      Head: Normocephalic and atraumatic        Right Ear: Tympanic membrane and external ear normal  No drainage  Left Ear: Tympanic membrane normal  No drainage  Mouth/Throat:      Pharynx: No oropharyngeal exudate  Eyes:      General:         Right eye: No discharge  Left eye: No discharge  Conjunctiva/sclera: Conjunctivae normal    Neck:      Thyroid: No thyromegaly  Cardiovascular:      Rate and Rhythm: Normal rate and regular rhythm  Heart sounds: Normal heart sounds  Pulmonary:      Effort: Pulmonary effort is normal  No respiratory distress  Breath sounds: No wheezing or rales  Musculoskeletal:      Cervical back: Normal range of motion and neck supple  Right lower leg: No edema  Left lower leg: No edema  Lymphadenopathy:      Cervical: No cervical adenopathy     Psychiatric:         Mood and Affect: Mood normal          Behavior: Behavior normal

## 2022-03-17 DIAGNOSIS — E89.41 HOT FLASHES DUE TO SURGICAL MENOPAUSE: ICD-10-CM

## 2022-03-17 RX ORDER — ESTRADIOL 0.5 MG/1
0.5 TABLET ORAL DAILY
Qty: 30 TABLET | Refills: 0 | Status: SHIPPED | OUTPATIENT
Start: 2022-03-17

## 2022-03-19 ENCOUNTER — APPOINTMENT (OUTPATIENT)
Dept: RADIOLOGY | Facility: CLINIC | Age: 62
End: 2022-03-19
Payer: COMMERCIAL

## 2022-03-19 ENCOUNTER — OFFICE VISIT (OUTPATIENT)
Dept: URGENT CARE | Facility: CLINIC | Age: 62
End: 2022-03-19
Payer: COMMERCIAL

## 2022-03-19 VITALS
RESPIRATION RATE: 16 BRPM | TEMPERATURE: 97.7 F | BODY MASS INDEX: 22.6 KG/M2 | SYSTOLIC BLOOD PRESSURE: 115 MMHG | HEART RATE: 77 BPM | OXYGEN SATURATION: 98 % | DIASTOLIC BLOOD PRESSURE: 79 MMHG | WEIGHT: 140 LBS

## 2022-03-19 DIAGNOSIS — M70.21 OLECRANON BURSITIS OF RIGHT ELBOW: Primary | ICD-10-CM

## 2022-03-19 DIAGNOSIS — M25.421 ELBOW SWELLING, RIGHT: ICD-10-CM

## 2022-03-19 PROCEDURE — S9083 URGENT CARE CENTER GLOBAL: HCPCS | Performed by: FAMILY MEDICINE

## 2022-03-19 PROCEDURE — 73080 X-RAY EXAM OF ELBOW: CPT

## 2022-03-19 PROCEDURE — G0382 LEV 3 HOSP TYPE B ED VISIT: HCPCS | Performed by: FAMILY MEDICINE

## 2022-03-19 RX ORDER — PREDNISONE 20 MG/1
40 TABLET ORAL DAILY
Qty: 6 TABLET | Refills: 0 | Status: SHIPPED | OUTPATIENT
Start: 2022-03-19 | End: 2022-03-22

## 2022-03-19 NOTE — PROGRESS NOTES
[facility]        NAME: Preston Ayala is a 63 y o  female  : 1960    MRN: [id number]  DATE: 2022  TIME: 3:01 PM    Assessment and Plan   Olecranon bursitis of right elbow [M70 21]  1  Olecranon bursitis of right elbow  predniSONE 20 mg tablet    Ambulatory referral to Orthopedic Surgery   2  Elbow swelling, right  XR elbow 3+ vw right         Patient Instructions       Follow up with PCP in 3-5 days  Proceed to  ER if symptoms worsen  Chief Complaint     Chief Complaint   Patient presents with    Arm Pain     Last wednesday:  pt R elbow swelling, redness, warm to touch  No known injury or insect bite  History of Present Illness       65-year-old female with 1 week history of swelling over elbow  She does not recall any injuries or trauma but does state that her elbow has become increasingly swollen, warm and tender to the touch  She is able to fully bend and extend the elbow without significant feelings of tightness  Denies any extremity weakness, numbness or tingling  Review of Systems   Review of Systems   Constitutional: Negative  HENT: Negative  Eyes: Negative  Respiratory: Negative  Cardiovascular: Negative  Gastrointestinal: Negative  Genitourinary: Negative  Musculoskeletal: Positive for arthralgias and joint swelling  Skin: Negative  Allergic/Immunologic: Negative  Neurological: Negative  Hematological: Negative  Psychiatric/Behavioral: Negative            Current Medications       Current Outpatient Medications:     atorvastatin (LIPITOR) 10 mg tablet, Take 1 tablet (10 mg total) by mouth daily, Disp: 30 tablet, Rfl: 5    clonazePAM (KlonoPIN) 0 5 mg tablet, Take 1 tablet (0 5 mg total) by mouth 2 (two) times a day as needed for seizures, Disp: 30 tablet, Rfl: 0    estradiol (ESTRACE) 0 5 MG tablet, Take 1 tablet (0 5 mg total) by mouth daily, Disp: 30 tablet, Rfl: 0    metFORMIN (GLUCOPHAGE-XR) 750 mg 24 hr tablet, Take 2 tablets (1,500 mg total) by mouth daily with breakfast, Disp: 60 tablet, Rfl: 2    pantoprazole (PROTONIX) 40 mg tablet, Take 1 tablet (40 mg total) by mouth daily, Disp: 30 tablet, Rfl: 2    predniSONE 20 mg tablet, Take 2 tablets (40 mg total) by mouth daily for 3 days, Disp: 6 tablet, Rfl: 0    Current Allergies     Allergies as of 03/19/2022    (No Known Allergies)            The following portions of the patient's history were reviewed and updated as appropriate: allergies, current medications, past family history, past medical history, past social history, past surgical history and problem list      Past Medical History:   Diagnosis Date    Acute pain of left shoulder 9/3/2019    Depression     Impingement syndrome of left shoulder 9/3/2019    Moderate protein-calorie malnutrition ([facility] ) 9/26/2018    Psychiatric disorder     Rib pain on left side 9/12/2019    Severe episode of recurrent major depressive disorder, without psychotic features ([facility] ) 9/21/2018    Suicidal ideations 9/21/2018       Past Surgical History:   Procedure Laterality Date    FL INJECTION LEFT SHOULDER (ARTHROGRAM)  9/11/2019    HYSTERECTOMY      age 38    OOPHORECTOMY         Family History   Problem Relation Age of Onset    No Known Problems Mother     Cancer Father         throat    No Known Problems Sister     Cancer Maternal Grandmother         unknown primary or age of onset    No Known Problems Maternal Grandfather     Cancer Paternal Grandmother         unknown primary or age of onset   Larry Zheng No Known Problems Paternal Grandfather     No Known Problems Sister     No Known Problems Sister     Ovarian cancer Maternal Aunt         late in life onset    Breast cancer Maternal Aunt         late in life onset    Cancer Maternal Aunt         unknown primary    Cancer Maternal Uncle     Cancer Maternal Uncle     Cancer Maternal Uncle     No Known Problems Maternal Uncle     No Known Problems Paternal Aunt Medications have been verified  Objective   /79   Pulse 77   Temp 97 7 °F (36 5 °C)   Resp 16   Wt 63 5 kg (140 lb)   SpO2 98%   BMI 22 60 kg/m²   No LMP recorded  Patient has had a hysterectomy  Physical Exam     Physical Exam  Vitals and nursing note reviewed  Constitutional:       Appearance: She is well-developed  HENT:      Head: Normocephalic  Eyes:      Pupils: Pupils are equal, round, and reactive to light  Cardiovascular:      Rate and Rhythm: Normal rate and regular rhythm  Pulmonary:      Effort: Pulmonary effort is normal    Musculoskeletal:         General: Swelling and tenderness present  No signs of injury  Normal range of motion  Cervical back: Normal range of motion  Skin:     General: Skin is warm and dry  Neurological:      Mental Status: She is alert and oriented to person, place, and time

## 2022-07-08 DIAGNOSIS — F41.1 GENERALIZED ANXIETY DISORDER: Chronic | ICD-10-CM

## 2022-07-08 RX ORDER — CLONAZEPAM 0.5 MG/1
0.5 TABLET ORAL 2 TIMES DAILY PRN
Qty: 30 TABLET | Refills: 0 | Status: SHIPPED | OUTPATIENT
Start: 2022-07-08

## 2023-01-01 NOTE — PATIENT INSTRUCTIONS
10% - bad control"> 10% - bad control,Hemoglobin A1c (HbA1c) greater than 10% indicating poor diabetic control,Haemoglobin A1c greater than 10% indicating poor diabetic control">   Diabetes Mellitus Type 2 in Adults, Ambulatory Care   GENERAL INFORMATION:   Diabetes mellitus type 2  is a disease that affects how your body uses glucose (sugar)  Insulin helps move sugar out of the blood so it can be used for energy  Normally, when the blood sugar level increases, the pancreas makes more insulin  Type 2 diabetes develops because either the body cannot make enough insulin, or it cannot use the insulin correctly  After many years, your pancreas may stop making insulin  Common symptoms include the following:   · More hunger or thirst than usual     · Frequent urination     · Weight loss without trying     · Blurred vision  Seek immediate care for the following symptoms:   · Severe abdominal pain, or pain that spreads to your back  You may also be vomiting  · Trouble staying awake or focusing    · Shaking or sweating    · Blurred or double vision    · Breath has a fruity, sweet smell    · Breathing is deep and labored, or rapid and shallow    · Heartbeat is fast and weak  Treatment for diabetes mellitus type 2  includes keeping your blood sugar at a normal level  You must eat the right foods, and exercise regularly  You may also need medicine if you cannot control your blood sugar level with nutrition and exercise  Manage diabetes mellitus type 2:   · Check your blood sugar level  You will be taught how to check a small drop of blood in a glucose monitor  Ask your healthcare provider when and how often to check during the day  Ask your healthcare provider what your blood sugar levels should be when you check them  · Keep track of carbohydrates (sugar and starchy foods)  Your blood sugar level can get too high if you eat too many carbohydrates   Your dietitian will help you plan meals and snacks that have the Pediatrician right amount of carbohydrates  · Eat low-fat foods  Some examples are skinless chicken and low-fat milk  · Eat less sodium (salt)  Some examples of high-sodium foods to limit are soy sauce, potato chips, and soup  Do not add salt to food you cook  Limit your use of table salt  · Eat high-fiber foods  Foods that are a good source of fiber include vegetables, whole grain bread, and beans  · Limit alcohol  Alcohol affects your blood sugar level and can make it harder to manage your diabetes  Women should limit alcohol to 1 drink a day  Men should limit alcohol to 2 drinks a day  A drink of alcohol is 12 ounces of beer, 5 ounces of wine, or 1½ ounces of liquor  · Get regular exercise  Exercise can help keep your blood sugar level steady, decrease your risk of heart disease, and help you lose weight  Exercise for at least 30 minutes, 5 days a week  Include muscle strengthening activities 2 days each week  Work with your healthcare provider to create an exercise plan  · Check your feet each day  for injuries or open sores  Ask your healthcare provider for activities you can do if you have an open sore  · Quit smoking  If you smoke, it is never too late to quit  Smoking can worsen the problems that may occur with diabetes  Ask your healthcare provider for information about how to stop smoking if you are having trouble quitting  · Ask about your weight:  Ask healthcare providers if you need to lose weight, and how much to lose  Ask them to help you with a weight loss program  Even a 10 to 15 pound weight loss can help you manage your blood sugar level  · Carry medical alert identification  Wear medical alert jewelry or carry a card that says you have diabetes  Ask your healthcare provider where to get these items  · Ask about vaccines  Diabetes puts you at risk of serious illness if you get the flu, pneumonia, or hepatitis   Ask your healthcare provider if you should get a flu, pneumonia, or hepatitis B vaccine, and when to get the vaccine  Follow up with your healthcare provider as directed:  Write down your questions so you remember to ask them during your visits  CARE AGREEMENT:   You have the right to help plan your care  Learn about your health condition and how it may be treated  Discuss treatment options with your caregivers to decide what care you want to receive  You always have the right to refuse treatment  The above information is an  only  It is not intended as medical advice for individual conditions or treatments  Talk to your doctor, nurse or pharmacist before following any medical regimen to see if it is safe and effective for you  © 2014 [address] is for End User's use only and may not be sold, redistributed or otherwise used for commercial purposes  All illustrations and images included in CareNotes® are the copyrighted property of [employer]  or Adi Boyer

## 2023-03-09 ENCOUNTER — OFFICE VISIT (OUTPATIENT)
Dept: FAMILY MEDICINE CLINIC | Facility: CLINIC | Age: 63
End: 2023-03-09

## 2023-03-09 VITALS
OXYGEN SATURATION: 99 % | HEART RATE: 70 BPM | BODY MASS INDEX: 21.69 KG/M2 | SYSTOLIC BLOOD PRESSURE: 126 MMHG | DIASTOLIC BLOOD PRESSURE: 78 MMHG | TEMPERATURE: 97.3 F | HEIGHT: 66 IN | WEIGHT: 135 LBS

## 2023-03-09 DIAGNOSIS — E78.5 HYPERLIPIDEMIA ASSOCIATED WITH TYPE 2 DIABETES MELLITUS (HCC): ICD-10-CM

## 2023-03-09 DIAGNOSIS — F41.1 GENERALIZED ANXIETY DISORDER: Chronic | ICD-10-CM

## 2023-03-09 DIAGNOSIS — E11.9 TYPE 2 DIABETES MELLITUS WITHOUT COMPLICATION, WITHOUT LONG-TERM CURRENT USE OF INSULIN (HCC): ICD-10-CM

## 2023-03-09 DIAGNOSIS — R10.31 RIGHT LOWER QUADRANT ABDOMINAL PAIN: ICD-10-CM

## 2023-03-09 DIAGNOSIS — K59.00 CONSTIPATION, UNSPECIFIED CONSTIPATION TYPE: ICD-10-CM

## 2023-03-09 DIAGNOSIS — E11.9 TYPE 2 DIABETES MELLITUS WITHOUT COMPLICATION, WITHOUT LONG-TERM CURRENT USE OF INSULIN (HCC): Primary | ICD-10-CM

## 2023-03-09 DIAGNOSIS — E11.69 HYPERLIPIDEMIA ASSOCIATED WITH TYPE 2 DIABETES MELLITUS (HCC): ICD-10-CM

## 2023-03-09 DIAGNOSIS — Z00.00 ANNUAL PHYSICAL EXAM: Primary | ICD-10-CM

## 2023-03-09 NOTE — PROGRESS NOTES
Subjective:   Chief Complaint   Patient presents with   • Constipation   • Physical Exam        Patient ID: Giulia Odonnell is a 63 y o  female  Patient for medical management/follow-up and wellness exam   1) diabetes-patient has stopped her medication  No recent labs  2) acute problem is constipation over the last 4 to 5 days  Did have small bowel movement which gave some relief  Has not been using over-the-counter laxatives  No blood noted when passing stool  Had Cologuard a year and a half ago  Has not had colonoscopy  Having some right lower quadrant discomfort  3) hyperlipidemia-no current meds, will need to check current levels  The following portions of the patient's history were reviewed and updated as appropriate: allergies, current medications, past family history, past medical history, past social history, past surgical history and problem list     Review of Systems   Constitutional: Negative for activity change, appetite change, diaphoresis and fatigue  HENT: Negative for congestion, sinus pressure and sore throat  Respiratory: Negative for cough, chest tightness, shortness of breath and wheezing  Cardiovascular: Negative for chest pain, palpitations and leg swelling  Fast or slow heart rate   Gastrointestinal: Positive for abdominal pain and constipation  Negative for abdominal distention, anal bleeding, blood in stool, diarrhea, nausea and vomiting  Genitourinary: Negative for difficulty urinating, dysuria, frequency, hematuria, vaginal bleeding and vaginal discharge  Neurological: Negative for dizziness, light-headedness and headaches  Psychiatric/Behavioral: Negative for dysphoric mood and sleep disturbance  The patient is not nervous/anxious                Objective:  Vitals:    03/09/23 0951   BP: 126/78   BP Location: Left arm   Patient Position: Sitting   Cuff Size: Standard   Pulse: 70   Temp: (!) 97 3 °F (36 3 °C)   TempSrc: Tympanic   SpO2: 99%   Weight: 61 2 kg (135 lb)   Height: 5' 6" (1 676 m)      Physical Exam  Vitals reviewed  Constitutional:       General: She is not in acute distress  Appearance: She is well-developed  She is not diaphoretic  HENT:      Head: Normocephalic and atraumatic  Right Ear: Tympanic membrane, ear canal and external ear normal       Left Ear: Tympanic membrane, ear canal and external ear normal       Nose: Nose normal  No mucosal edema or rhinorrhea  Right Sinus: No maxillary sinus tenderness  Left Sinus: No maxillary sinus tenderness  Mouth/Throat:      Mouth: Mucous membranes are not pale and not dry  Dentition: Normal dentition  Pharynx: Uvula midline  No oropharyngeal exudate  Eyes:      General:         Right eye: No discharge  Left eye: No discharge  Extraocular Movements: Extraocular movements intact  Conjunctiva/sclera: Conjunctivae normal       Pupils: Pupils are equal, round, and reactive to light  Neck:      Thyroid: No thyromegaly  Cardiovascular:      Rate and Rhythm: Normal rate and regular rhythm  Pulses: no weak pulses          Dorsalis pedis pulses are 2+ on the right side and 2+ on the left side  Posterior tibial pulses are 2+ on the right side and 2+ on the left side  Heart sounds: Normal heart sounds  No murmur heard  Pulmonary:      Effort: Pulmonary effort is normal  No respiratory distress  Breath sounds: Normal breath sounds  No wheezing or rales  Abdominal:      General: There is no distension  Palpations: Abdomen is soft  There is no mass  Tenderness: There is abdominal tenderness in the right lower quadrant  There is no guarding or rebound  Musculoskeletal:      Cervical back: Normal range of motion and neck supple  Right lower leg: No edema  Left lower leg: No edema  Feet:      Right foot:      Skin integrity: No ulcer, skin breakdown, erythema, warmth, callus or dry skin        Left foot: Skin integrity: No ulcer, skin breakdown, erythema, warmth, callus or dry skin  Lymphadenopathy:      Cervical: No cervical adenopathy  Skin:     Findings: No rash  Neurological:      Mental Status: She is alert and oriented to person, place, and time  Cranial Nerves: No cranial nerve deficit  Psychiatric:         Mood and Affect: Mood normal          Behavior: Behavior normal           Patient's shoes and socks removed  Right Foot/Ankle   Right Foot Inspection  Skin Exam: skin normal and skin intact  No dry skin, no warmth, no callus, no erythema, no maceration, no abnormal color, no pre-ulcer, no ulcer and no callus  Toe Exam: ROM and strength within normal limits  Sensory   Vibration: intact  Proprioception: intact  Monofilament testing: intact    Vascular  Capillary refills: < 3 seconds  The right DP pulse is 2+  The right PT pulse is 2+  Left Foot/Ankle  Left Foot Inspection  Skin Exam: skin normal and skin intact  No dry skin, no warmth, no erythema, no maceration, normal color, no pre-ulcer, no ulcer and no callus  Toe Exam: ROM and strength within normal limits  Sensory   Vibration: intact  Proprioception: intact  Monofilament testing: intact    Vascular  Capillary refills: < 3 seconds  The left DP pulse is 2+  The left PT pulse is 2+  Assign Risk Category  No deformity present  No loss of protective sensation  No weak pulses  Risk: 0      Assessment/Plan:    No problem-specific Assessment & Plan notes found for this encounter  Diagnoses and all orders for this visit:    Annual physical exam    Constipation, unspecified constipation type  -     CBC and differential; Future  -     TSH, 3rd generation with Free T4 reflex; Future  -     CBC and differential  -     TSH, 3rd generation with Free T4 reflex    Right lower quadrant abdominal pain  -     CBC and differential; Future  -     C-reactive protein;  Future  -     CBC and differential  -     C-reactive protein    Type 2 diabetes mellitus without complication, without long-term current use of insulin (HCC)  -     Comprehensive metabolic panel; Future  -     Lipid Panel with Direct LDL reflex; Future  -     Hemoglobin A1C With EAG; Future  -     Comprehensive metabolic panel  -     Lipid Panel with Direct LDL reflex    Generalized anxiety disorder  -     TSH, 3rd generation with Free T4 reflex; Future  -     TSH, 3rd generation with Free T4 reflex    Hyperlipidemia associated with type 2 diabetes mellitus ([facility] [address] )        Medical management-patient to use stool softener daily  Trial of milk of magnesia  Get labs to update diabetic status and cholesterol  If symptoms do not resolve may need to do CT scan

## 2023-03-09 NOTE — PATIENT INSTRUCTIONS
Medical management-patient to use stool softener daily  Trial of milk of magnesia  Get labs to update diabetic status and cholesterol  If symptoms do not resolve may need to do CT scan  Wellness Visit for Adults   AMBULATORY CARE:   A wellness visit  is when you see your healthcare provider to get screened for health problems  Your healthcare provider will also give you advice on how to stay healthy  Write down your questions so you remember to ask them  Ask your healthcare provider how often you should have a wellness visit  What happens at a wellness visit:  Your healthcare provider will ask about your health, and your family history of health problems  This includes high blood pressure, heart disease, and cancer  He or she will ask if you have symptoms that concern you, if you smoke, and about your mood  You may also be asked about your intake of medicines, supplements, food, and alcohol  Any of the following may be done: Your weight  will be checked  Your height may also be checked so your body mass index (BMI) can be calculated  Your BMI shows if you are at a healthy weight  Your blood pressure  and heart rate will be checked  Your temperature may also be checked  Blood and urine tests  may be done  Blood tests may be done to check your cholesterol levels  Abnormal cholesterol levels increase your risk for heart disease and stroke  You may also need a blood or urine test to check for diabetes if you are at increased risk  Urine tests may be done to look for signs of an infection or kidney disease  A physical exam  includes checking your heartbeat and lungs with a stethoscope  Your healthcare provider may also check your skin to look for sun damage  Screening tests  may be recommended  A screening test is done to check for diseases that may not cause symptoms  The screening tests you may need depend on your age, gender, family history, and lifestyle habits   For example, colorectal screening may be recommended if you are 48years old or older  Screening tests you need if you are a woman:   A Pap smear  is used to screen for cervical cancer  Pap smears are usually done every 3 to 5 years depending on your age  You may need them more often if you have had abnormal Pap smear test results in the past  Ask your healthcare provider how often you should have a Pap smear  A mammogram  is an x-ray of your breasts to screen for breast cancer  Experts recommend mammograms every 2 years starting at age 48 years  You may need a mammogram at age 52 years or younger if you have an increased risk for breast cancer  Talk to your healthcare provider about when you should start having mammograms and how often you need them  Vaccines you may need:   Get an influenza vaccine  every year  The influenza vaccine protects you from the flu  Several types of viruses cause the flu  The viruses change over time, so new vaccines are made each year  Get a tetanus-diphtheria (Td) booster vaccine  every 10 years  This vaccine protects you against tetanus and diphtheria  Tetanus is a severe infection that may cause painful muscle spasms and lockjaw  Diphtheria is a severe bacterial infection that causes a thick covering in the back of your mouth and throat  Get a human papillomavirus (HPV) vaccine  if you are female and aged 23 to 32 or male 23 to 24 and never received it  This vaccine protects you from HPV infection  HPV is the most common infection spread by sexual contact  HPV may also cause vaginal, penile, and anal cancers  Get a pneumococcal vaccine  if you are aged 72 years or older  The pneumococcal vaccine is an injection given to protect you from pneumococcal disease  Pneumococcal disease is an infection caused by pneumococcal bacteria  The infection may cause pneumonia, meningitis, or an ear infection  Get a shingles vaccine  if you are 60 or older, even if you have had shingles before   The shingles vaccine is an injection to protect you from the varicella-zoster virus  This is the same virus that causes chickenpox  Shingles is a painful rash that develops in people who had chickenpox or have been exposed to the virus  How to eat healthy:  My Plate is a model for planning healthy meals  It shows the types and amounts of foods that should go on your plate  Fruits and vegetables make up about half of your plate, and grains and protein make up the other half  A serving of dairy is included on the side of your plate  The amount of calories and serving sizes you need depends on your age, gender, weight, and height  Examples of healthy foods are listed below:  Eat a variety of vegetables  such as dark green, red, and orange vegetables  You can also include canned vegetables low in sodium (salt) and frozen vegetables without added butter or sauces  Eat a variety of fresh fruits , canned fruit in 100% juice, frozen fruit, and dried fruit  Include whole grains  At least half of the grains you eat should be whole grains  Examples include whole-wheat bread, wheat pasta, brown rice, and whole-grain cereals such as oatmeal     Eat a variety of protein foods such as seafood (fish and shellfish), lean meat, and poultry without skin (turkey and chicken)  Examples of lean meats include pork leg, shoulder, or tenderloin, and beef round, sirloin, tenderloin, and extra lean ground beef  Other protein foods include eggs and egg substitutes, beans, peas, soy products, nuts, and seeds  Choose low-fat dairy products such as skim or 1% milk or low-fat yogurt, cheese, and cottage cheese  Limit unhealthy fats  such as butter, hard margarine, and shortening  Exercise:  Exercise at least 30 minutes per day on most days of the week  Some examples of exercise include walking, biking, dancing, and swimming   You can also fit in more physical activity by taking the stairs instead of the elevator or parking farther away from stores  Include muscle strengthening activities 2 days each week  Regular exercise provides many health benefits  It helps you manage your weight, and decreases your risk for type 2 diabetes, heart disease, stroke, and high blood pressure  Exercise can also help improve your mood  Ask your healthcare provider about the best exercise plan for you  General health and safety guidelines:   Do not smoke  Nicotine and other chemicals in cigarettes and cigars can cause lung damage  Ask your healthcare provider for information if you currently smoke and need help to quit  E-cigarettes or smokeless tobacco still contain nicotine  Talk to your healthcare provider before you use these products  Limit alcohol  A drink of alcohol is 12 ounces of beer, 5 ounces of wine, or 1½ ounces of liquor  Lose weight, if needed  Being overweight increases your risk of certain health conditions  These include heart disease, high blood pressure, type 2 diabetes, and certain types of cancer  Protect your skin  Do not sunbathe or use tanning beds  Use sunscreen with a SPF 15 or higher  Apply sunscreen at least 15 minutes before you go outside  Reapply sunscreen every 2 hours  Wear protective clothing, hats, and sunglasses when you are outside  Drive safely  Always wear your seatbelt  Make sure everyone in your car wears a seatbelt  A seatbelt can save your life if you are in an accident  Do not use your cell phone when you are driving  This could distract you and cause an accident  Pull over if you need to make a call or send a text message  Practice safe sex  Use latex condoms if are sexually active and have more than one partner  Your healthcare provider may recommend screening tests for sexually transmitted infections (STIs)  Wear helmets, lifejackets, and protective gear  Always wear a helmet when you ride a bike or motorcycle, go skiing, or play sports that could cause a head injury   Wear protective equipment when you play sports  Wear a lifejacket when you are on a boat or doing water sports  © Copyright [employer] 2022 Information is for End User's use only and may not be sold, redistributed or otherwise used for commercial purposes  The above information is an  only  It is not intended as medical advice for individual conditions or treatments  Talk to your doctor, nurse or pharmacist before following any medical regimen to see if it is safe and effective for you

## 2023-03-09 NOTE — PROGRESS NOTES
[address]    NAME: Larry Mireles  AGE: 63 y o  SEX: female  : 1960     DATE: 3/9/2023     Assessment and Plan:     Problem List Items Addressed This Visit    None      Immunizations and preventive care screenings were discussed with patient today  Appropriate education was printed on patient's after visit summary  Counseling:  Alcohol/drug use: discussed moderation in alcohol intake, the recommendations for healthy alcohol use, and avoidance of illicit drug use  Dental Health: discussed importance of regular tooth brushing, flossing, and dental visits  · Exercise: the importance of regular exercise/physical activity was discussed  Recommend exercise 3-5 times per week for at least 30 minutes  No follow-ups on file  Chief Complaint:     Chief Complaint   Patient presents with   • Constipation   • Physical Exam      History of Present Illness:     Adult Annual Physical   Patient here for a comprehensive physical exam  The patient reports SEE LIST  Diet and Physical Activity  · Diet/Nutrition: well balanced diet and consuming 3-5 servings of fruits/vegetables daily  · Exercise: 5-7 times a week on average  Depression Screening  PHQ-2/9 Depression Screening    Little interest or pleasure in doing things: 0 - not at all  Feeling down, depressed, or hopeless: 0 - not at all  PHQ-2 Score: 0  PHQ-2 Interpretation: Negative depression screen       General Health  · Sleep: sleeps well  · Hearing: normal - bilateral   · Vision: wears glasses  · Dental: regular dental visits, brushes teeth once daily and flosses teeth occasionally  /GYN Health  · Patient is: postmenopausal  · Last menstrual period: -  · Contraceptive method: n/a  Review of Systems:     Review of Systems   Constitutional: Negative for activity change, appetite change, diaphoresis and fatigue     HENT: Negative for congestion, sinus pressure and sore throat  Respiratory: Negative for cough, chest tightness, shortness of breath and wheezing  Cardiovascular: Negative for chest pain, palpitations and leg swelling  Fast or slow heart rate   Gastrointestinal: Positive for abdominal pain and constipation  Negative for anal bleeding, blood in stool, diarrhea, nausea and vomiting  Genitourinary: Negative for difficulty urinating, dysuria, frequency and hematuria  Musculoskeletal: Negative for arthralgias, gait problem, joint swelling and myalgias  Neurological: Negative for dizziness, light-headedness and headaches  Psychiatric/Behavioral: Negative for agitation, confusion, dysphoric mood and sleep disturbance  The patient is not nervous/anxious  Past Medical History:     Past Medical History:   Diagnosis Date   • Acute pain of left shoulder 9/3/2019   • Depression    • Impingement syndrome of left shoulder 9/3/2019   • Moderate protein-calorie malnutrition ([email or web address] ) 9/26/2018   • Psychiatric disorder    • Rib pain on left side 9/12/2019   • Severe episode of recurrent major depressive disorder, without psychotic features ([email or web address] ) 9/21/2018   • Suicidal ideations 9/21/2018      Past Surgical History:     Past Surgical History:   Procedure Laterality Date   • FL INJECTION LEFT SHOULDER (ARTHROGRAM)  9/11/2019   • HYSTERECTOMY      age 38   • OOPHORECTOMY        Social History:     Social History     Socioeconomic History   • Marital status: Single     Spouse name: None   • Number of children: 0   • Years of education: None   • Highest education level: None   Occupational History   • Occupation:    Tobacco Use   • Smoking status: Every Day     Packs/day: 0 00     Types: Cigarettes   • Smokeless tobacco: Never   • Tobacco comments:     1 cigarette every couple days   Vaping Use   • Vaping Use: Never used   Substance and Sexual Activity   • Alcohol use:  Yes     Alcohol/week: 1 0 standard drink Types: 1 Glasses of wine per week     Comment: "sometimes"   • Drug use: Not Currently     Types: Marijuana   • Sexual activity: None   Other Topics Concern   • None   Social History Narrative   • None     Social Determinants of Health     Financial Resource Strain: Not on file   Food Insecurity: Not on file   Transportation Needs: Not on file   Physical Activity: Not on file   Stress: Not on file   Social Connections: Not on file   Intimate Partner Violence: Not on file   Housing Stability: Not on file      Family History:     Family History   Problem Relation Age of Onset   • No Known Problems Mother    • Cancer Father         throat   • No Known Problems Sister    • Cancer Maternal Grandmother         unknown primary or age of onset   • No Known Problems Maternal Grandfather    • Cancer Paternal Grandmother         unknown primary or age of onset   • No Known Problems Paternal Grandfather    • No Known Problems Sister    • No Known Problems Sister    • Ovarian cancer Maternal Aunt         late in life onset   • Breast cancer Maternal Aunt         late in life onset   • Cancer Maternal Aunt         unknown primary   • Cancer Maternal Uncle    • Cancer Maternal Uncle    • Cancer Maternal Uncle    • No Known Problems Maternal Uncle    • No Known Problems Paternal Aunt       Current Medications:     Current Outpatient Medications   Medication Sig Dispense Refill   • atorvastatin (LIPITOR) 10 mg tablet Take 1 tablet (10 mg total) by mouth daily 30 tablet 5   • clonazePAM (KlonoPIN) 0 5 mg tablet Take 1 tablet (0 5 mg total) by mouth 2 (two) times a day as needed for seizures 30 tablet 0   • estradiol (ESTRACE) 0 5 MG tablet Take 1 tablet (0 5 mg total) by mouth daily 30 tablet 0   • pantoprazole (PROTONIX) 40 mg tablet Take 1 tablet (40 mg total) by mouth daily 30 tablet 2   • metFORMIN (GLUCOPHAGE-XR) 750 mg 24 hr tablet Take 2 tablets (1,500 mg total) by mouth daily with breakfast 60 tablet 2     No current facility-administered medications for this visit  Allergies:     No Known Allergies   Physical Exam:     /78 (BP Location: Left arm, Patient Position: Sitting, Cuff Size: Standard)   Pulse 70   Temp (!) 97 3 °F (36 3 °C) (Tympanic)   Ht 5' 6" (1 676 m)   Wt 61 2 kg (135 lb)   SpO2 99%   BMI 21 79 kg/m²     Physical Exam  Vitals and nursing note reviewed  Constitutional:       General: She is not in acute distress  Appearance: She is not ill-appearing  HENT:      Head: Normocephalic and atraumatic  Right Ear: Tympanic membrane, ear canal and external ear normal       Left Ear: Tympanic membrane, ear canal and external ear normal       Nose: Nose normal       Mouth/Throat:      Pharynx: No posterior oropharyngeal erythema  Eyes:      General:         Right eye: No discharge  Left eye: No discharge  Extraocular Movements: Extraocular movements intact  Conjunctiva/sclera: Conjunctivae normal       Pupils: Pupils are equal, round, and reactive to light  Neck:      Thyroid: No thyromegaly  Vascular: No carotid bruit  Trachea: No tracheal deviation  Cardiovascular:      Rate and Rhythm: Normal rate and regular rhythm  Heart sounds: Normal heart sounds  No murmur heard  Pulmonary:      Effort: Pulmonary effort is normal  No respiratory distress  Breath sounds: Normal breath sounds  No wheezing  Abdominal:      General: Bowel sounds are normal  There is no distension  Palpations: Abdomen is soft  There is no mass  Tenderness: There is abdominal tenderness in the right lower quadrant  There is no guarding  Musculoskeletal:      Cervical back: Normal range of motion and neck supple  Right lower leg: No edema  Left lower leg: No edema  Lymphadenopathy:      Cervical: No cervical adenopathy  Skin:     Findings: No rash  Neurological:      General: No focal deficit present        Mental Status: She is alert and oriented to person, place, and time  Cranial Nerves: No cranial nerve deficit        Deep Tendon Reflexes: Reflexes normal    Psychiatric:         Mood and Affect: Mood normal          Behavior: Behavior normal           Stephanie Carl MD  [id number]

## 2023-03-29 ENCOUNTER — OFFICE VISIT (OUTPATIENT)
Dept: FAMILY MEDICINE CLINIC | Facility: CLINIC | Age: 63
End: 2023-03-29

## 2023-03-29 VITALS — DIASTOLIC BLOOD PRESSURE: 78 MMHG | SYSTOLIC BLOOD PRESSURE: 122 MMHG

## 2023-03-29 DIAGNOSIS — E11.9 TYPE 2 DIABETES MELLITUS WITHOUT COMPLICATION, WITHOUT LONG-TERM CURRENT USE OF INSULIN (HCC): ICD-10-CM

## 2023-03-29 DIAGNOSIS — Z12.31 SCREENING MAMMOGRAM, ENCOUNTER FOR: ICD-10-CM

## 2023-03-29 DIAGNOSIS — S20.362A TICK BITE OF LEFT FRONT WALL OF THORAX, INITIAL ENCOUNTER: Primary | ICD-10-CM
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
## 2023-03-29 NOTE — PROGRESS NOTES
[id number]         NAME: Miki Thomason is a 63 y o  female  : 1960    MRN: [id number]  DATE: 2023  TIME: 11:58 AM    Assessment and Plan   Tick bite of left front wall of thorax, initial encounter [S20 362A, W57  XXXA]  1  Tick bite of left front wall of thorax, initial encounter        2  Screening mammogram, encounter for  Mammo screening bilateral w 3d & cad    CANCELED: Mammo screening bilateral w 3d & cad      3  Type 2 diabetes mellitus without complication, without long-term current use of insulin (HCC)  Microalbumin, Random Urine (W/Creatinine) ([facility] ONLY)    Microalbumin, Random Urine (W/Creatinine) ([facility] ONLY)          No problem-specific Assessment & Plan notes found for this encounter  Patient Instructions     Patient Instructions   Observe this area locally  You could use some bacitracin cream topically  Call if you start getting any symptoms to suggest systemic infection such as fever, chills, expanding rash, joint aches or muscle aches  Get labs as previously ordered  Also ordered for urine microalbumin  Chief Complaint     Chief Complaint   Patient presents with   • Insect Bite     Removed  -- fairly certain less than 24 hours         History of Present Illness       Patient removed the tick from the left side of her chest on Monday  Thinks it may have been there for 24 hours  No current systemic symptoms  Review of Systems   Review of Systems   Constitutional: Negative for appetite change, chills, diaphoresis and fever  HENT: Negative for congestion, ear pain, rhinorrhea, sinus pressure and sore throat  Eyes: Negative for discharge, redness and itching  Respiratory: Negative for cough, shortness of breath and wheezing  Cardiovascular: Negative for chest pain and palpitations  Rapid or slow heart rate   Gastrointestinal: Negative for diarrhea, nausea and vomiting  Skin: Positive for wound  Negative for rash           Current Medications       Current Outpatient Medications:   •  atorvastatin (LIPITOR) 10 mg tablet, Take 1 tablet (10 mg total) by mouth daily, Disp: 30 tablet, Rfl: 5  •  clonazePAM (KlonoPIN) 0 5 mg tablet, Take 1 tablet (0 5 mg total) by mouth 2 (two) times a day as needed for seizures, Disp: 30 tablet, Rfl: 0  •  estradiol (ESTRACE) 0 5 MG tablet, Take 1 tablet (0 5 mg total) by mouth daily, Disp: 30 tablet, Rfl: 0  •  metFORMIN (GLUCOPHAGE-XR) 750 mg 24 hr tablet, Take 2 tablets (1,500 mg total) by mouth daily with breakfast, Disp: 60 tablet, Rfl: 2  •  pantoprazole (PROTONIX) 40 mg tablet, Take 1 tablet (40 mg total) by mouth daily, Disp: 30 tablet, Rfl: 2    Current Allergies     Allergies as of 03/29/2023   • (No Known Allergies)            The following portions of the patient's history were reviewed and updated as appropriate: allergies, current medications, past family history, past medical history, past social history, past surgical history and problem list      Past Medical History:   Diagnosis Date   • Acute pain of left shoulder 9/3/2019   • Depression    • Impingement syndrome of left shoulder 9/3/2019   • Moderate protein-calorie malnutrition ([facility] [address] ) 9/26/2018   • Psychiatric disorder    • Rib pain on left side 9/12/2019   • Severe episode of recurrent major depressive disorder, without psychotic features ([address] ) 9/21/2018   • Suicidal ideations 9/21/2018       Past Surgical History:   Procedure Laterality Date   • FL INJECTION LEFT SHOULDER (ARTHROGRAM)  9/11/2019   • HYSTERECTOMY      age 38   • OOPHORECTOMY         Family History   Problem Relation Age of Onset   • No Known Problems Mother    • Cancer Father         throat   • No Known Problems Sister    • Cancer Maternal Grandmother         unknown primary or age of onset   • No Known Problems Maternal Grandfather    • Cancer Paternal Grandmother         unknown primary or age of onset   • No Known Problems Paternal Grandfather    • No Known Problems Sister    • No Known Problems Sister    • Ovarian cancer Maternal Aunt         late in life onset   • Breast cancer Maternal Aunt         late in life onset   • Cancer Maternal Aunt         unknown primary   • Cancer Maternal Uncle    • Cancer Maternal Uncle    • Cancer Maternal Uncle    • No Known Problems Maternal Uncle    • No Known Problems Paternal Aunt          Medications have been verified  Objective   /78        Physical Exam     Physical Exam  Vitals reviewed  Constitutional:       General: She is not in acute distress  Appearance: She is well-developed  She is not ill-appearing  HENT:      Head: Normocephalic and atraumatic  Right Ear: Tympanic membrane and external ear normal  No drainage  Left Ear: Tympanic membrane normal  No drainage  Mouth/Throat:      Pharynx: No oropharyngeal exudate or posterior oropharyngeal erythema  Eyes:      General:         Right eye: No discharge  Left eye: No discharge  Extraocular Movements: Extraocular movements intact  Conjunctiva/sclera: Conjunctivae normal       Pupils: Pupils are equal, round, and reactive to light  Neck:      Thyroid: No thyromegaly  Cardiovascular:      Rate and Rhythm: Normal rate and regular rhythm  Heart sounds: Normal heart sounds  Pulmonary:      Effort: Pulmonary effort is normal  No respiratory distress  Breath sounds: No wheezing or rales  Musculoskeletal:      Cervical back: Normal range of motion and neck supple  Lymphadenopathy:      Cervical: No cervical adenopathy  Skin:     Findings: Erythema present  No rash  Comments: Puncture wound left anterior chest wall erythema only 3 to 4 mm  Neurological:      Mental Status: She is alert

## 2023-03-29 NOTE — PATIENT INSTRUCTIONS
Observe this area locally  You could use some bacitracin cream topically  Call if you start getting any symptoms to suggest systemic infection such as fever, chills, expanding rash, joint aches or muscle aches  Get labs as previously ordered  Also ordered for urine microalbumin

## 2023-04-24 ENCOUNTER — TELEPHONE (OUTPATIENT)
Dept: FAMILY MEDICINE CLINIC | Facility: CLINIC | Age: 63
End: 2023-04-24

## 2023-04-24 NOTE — TELEPHONE ENCOUNTER
----- Message from Monica Caicedo MD sent at 4/24/2023 10:10 AM EDT -----  Call patient with lab result-poor control of sugars, should have office visit to adjust meds and discuss

## 2023-05-01 ENCOUNTER — OFFICE VISIT (OUTPATIENT)
Dept: FAMILY MEDICINE CLINIC | Facility: CLINIC | Age: 63
End: 2023-05-01

## 2023-05-01 VITALS
SYSTOLIC BLOOD PRESSURE: 106 MMHG | TEMPERATURE: 96.4 F | DIASTOLIC BLOOD PRESSURE: 69 MMHG | OXYGEN SATURATION: 97 % | HEART RATE: 59 BPM | WEIGHT: 133 LBS | BODY MASS INDEX: 21.47 KG/M2

## 2023-05-01 DIAGNOSIS — E89.41 HOT FLASHES DUE TO SURGICAL MENOPAUSE: ICD-10-CM

## 2023-05-01 DIAGNOSIS — E11.9 TYPE 2 DIABETES MELLITUS WITHOUT COMPLICATION, WITHOUT LONG-TERM CURRENT USE OF INSULIN (HCC): Primary | ICD-10-CM

## 2023-05-01 DIAGNOSIS — E11.69 HYPERLIPIDEMIA ASSOCIATED WITH TYPE 2 DIABETES MELLITUS (HCC): ICD-10-CM

## 2023-05-01 DIAGNOSIS — F41.1 GENERALIZED ANXIETY DISORDER: Chronic | ICD-10-CM

## 2023-05-01 DIAGNOSIS — E78.5 HYPERLIPIDEMIA ASSOCIATED WITH TYPE 2 DIABETES MELLITUS (HCC): ICD-10-CM

## 2023-05-01 RX ORDER — ESTRADIOL 0.5 MG/1
0.5 TABLET ORAL DAILY
Qty: 30 TABLET | Refills: 3 | Status: SHIPPED | OUTPATIENT
Start: 2023-05-01

## 2023-05-01 RX ORDER — LISINOPRIL 2.5 MG/1
2.5 TABLET ORAL DAILY
Qty: 30 TABLET | Refills: 3 | Status: SHIPPED | OUTPATIENT
Start: 2023-05-01

## 2023-05-01 RX ORDER — ATORVASTATIN CALCIUM 10 MG/1
10 TABLET, FILM COATED ORAL DAILY
Qty: 30 TABLET | Refills: 3 | Status: SHIPPED | OUTPATIENT
Start: 2023-05-01

## 2023-05-01 RX ORDER — METFORMIN HYDROCHLORIDE 750 MG/1
1500 TABLET, EXTENDED RELEASE ORAL
Qty: 60 TABLET | Refills: 3 | Status: SHIPPED | OUTPATIENT
Start: 2023-05-01 | End: 2023-10-28

## 2023-05-01 NOTE — PROGRESS NOTES
[id number]        NAME: Cong Bolton is a 63 y o  female  : 1960    MRN: [id number]  DATE: May 1, 2023  TIME: 1:41 PM    Assessment and Plan   Type 2 diabetes mellitus without complication, without long-term current use of insulin ([facility] [place] [address] ) [E11 9]  1  Type 2 diabetes mellitus without complication, without long-term current use of insulin (HCC)  atorvastatin (LIPITOR) 10 mg tablet    metFORMIN (GLUCOPHAGE-XR) 750 mg 24 hr tablet    lisinopril (ZESTRIL) 2 5 mg tablet    Basic metabolic panel    Hemoglobin A1c (w/out EAG) ([facility] ONLY)    Basic metabolic panel    Hemoglobin A1c (w/out EAG) ([facility] ONLY)      2  Hyperlipidemia associated with type 2 diabetes mellitus (HCC)  atorvastatin (LIPITOR) 10 mg tablet      3  Generalized anxiety disorder        4  Hot flashes due to surgical menopause  estradiol (ESTRACE) 0 5 MG tablet          No problem-specific Assessment & Plan notes found for this encounter  Patient Instructions     Patient Instructions     Restart medications as ordered  Repeat labs in 3 months with office visit after  Consider diabetic teaching  Follow-up with dentist in  [place] this for good mouth care  Schedule your mammogram   My staff will order a glucometer  You should be checking fasting sugars at least 3-4 times per week  Goal should be less than 150  Type 2 Diabetes Management for Adults   AMBULATORY CARE:   Type 2 diabetes  is a disease that affects how your body uses glucose (sugar)  Either your body cannot make enough insulin, or it cannot use the insulin correctly  It is important to keep diabetes controlled to prevent damage to your heart, blood vessels, and other organs  Management will help you feel well and enjoy your daily activities  Your diabetes care team providers can help you make a plan to fit diabetes care into your schedule  Your plan can change over time to fit your needs and your family's needs         Have someone call your local emergency number (911 in the [address]) if:    You cannot be woken   You have signs of diabetic ketoacidosis:     ? confusion, fatigue    ? vomiting    ? rapid heartbeat    ? fruity smelling breath    ? extreme thirst    ? dry mouth and skin     You have any of the following signs of a heart attack:      ? Squeezing, pressure, or pain in your chest    ? You may  also have any of the following:     - Discomfort or pain in your back, neck, jaw, stomach, or arm    - Shortness of breath    - Nausea or vomiting    - Lightheadedness or a sudden cold sweat     You have any of the following signs of a stroke:      ? Numbness or drooping on one side of your face     ? Weakness in an arm or leg    ? Confusion or difficulty speaking    ? Dizziness, a severe headache, or vision loss    Call your doctor or diabetes care team provider if:    You have a sore or wound that will not heal      You have a change in the amount you urinate   Your blood sugar levels are higher than your target goals   You often have lower blood sugar levels than your target goals   Your skin is red, dry, warm, or swollen   You have trouble coping with diabetes, or you feel anxious or depressed   You have questions or concerns about your condition or care  What you need to know about high blood sugar levels:  High blood sugar levels may not cause any symptoms  You may feel more thirsty or urinate more often than usual  Over time, high blood sugar levels can damage your nerves, blood vessels, tissues, and organs  The following can increase your blood sugar levels:   Large meals or large amounts of carbohydrates at one time     Less physical activity     Stress     Illness     A lower dose of diabetes medicine or insulin, or a late dose    What you need to know about low blood sugar levels:  Symptoms include feeling shaky, dizzy, irritable, or confused   You can prevent symptoms by keeping your blood sugar levels from going too low   Treat a low blood sugar level right away:      ? Drink 4 ounces of juice or have 1 tube of glucose gel  ? Check your blood sugar level again 10 to 15 minutes later  ? When the level goes back to normal, eat a meal or snack to prevent another decrease   Keep glucose gel, raisins, or hard candy with you at all times to treat a low blood sugar level   Your blood sugar level can get too low if you take diabetes medicine or insulin and do not eat enough food   If you use insulin, check your blood sugar level before you exercise  ? If your blood sugar level is below 100 mg/dL, eat 4 crackers or 2 ounces of raisins, or drink 4 ounces of juice  ? Check your level every 30 minutes if you exercise longer than 1 hour  ? You may need a snack during or after exercise  What you can do to manage your blood sugar levels:    Check your blood sugar levels as directed and as needed  Several items are available to use to check your levels  You may need to check by testing a drop of blood in a glucose monitor  You may instead be given a continuous glucose monitoring (CGM) device  The device is worn at all times  The CGM checks your blood sugar level every 5 minutes  It sends results to an electronic device such as a smart phone  A CGM can be used with or without an insulin pump  You and your diabetes care team providers will decide on the best method for you  The goal for blood sugar levels before meals  is between 80 and 130 mg/dL and 2 hours after eating  is lower than 180 mg/dL   Make healthy food choices  Work with a dietitian to develop a meal plan that works for you and your schedule  A dietitian can help you learn how to eat the right amount of carbohydrates during your meals and snacks  Carbohydrates can raise your blood sugar level if you eat too many at one time  Examples of foods that contain carbohydrates are breads, cereals, rice, pasta, and sweets   Eat high-fiber foods as directed  Fiber helps improve blood sugar levels  Fiber also lowers your risk for heart disease and other problems diabetes can cause  Examples of high-fiber foods include vegetables, whole-grain bread, and beans such as ortega beans  Your dietitian can tell you how much fiber to have each day   Get regular physical activity  Physical activity can help you get to your target blood sugar level goal and manage your weight  Get at least 150 minutes of moderate to vigorous aerobic physical activity each week  Do not miss more than 2 days in a row  Do not sit longer than 30 minutes at a time  Your healthcare provider can help you create an activity plan  The plan can include the best activities for you and can help you build your strength and endurance   Maintain a healthy weight  Ask your team what a healthy weight is for you  A healthy weight can help you control diabetes and prevent heart disease  Ask your team to help you create a weight loss plan, if needed  Weight loss can help make a difference in managing diabetes  Your team will help you set a weight-loss goal, such as 10 to 15 pounds, or 5% of your extra weight  Together you and your team can set manageable weight loss goals   Take your diabetes medicine or insulin as directed  You may need diabetes medicine, insulin, or both to help control your blood sugar levels  Your healthcare provider will teach you how and when to take your diabetes medicine or insulin  You will also be taught about side effects oral diabetes medicine can cause  Insulin may be injected or given through a pump or pen  You and your providers will decide on the best method for you:    ? An insulin pump  is an implanted device that gives your insulin 24 hours a day  An insulin pump prevents the need for multiple insulin injections in a day  ? An insulin pen  is a device prefilled with the right amount of insulin  ? You and your family members will be taught how to draw up and give insulin  if this is the best method for you  Your providers will also teach you how to dispose of needles and syringes  ? You will learn how much insulin you need  and when to give it  You will be taught when not to give insulin  You will also be taught what to do if your blood sugar level drops too low  This may happen if you take insulin and do not eat the right amount of carbohydrates  More ways to manage type 2 diabetes:    Wear medical alert identification  Wear medical alert jewelry or carry a card that says you have diabetes  Ask your provider where to get these items   Do not smoke  Nicotine and other chemicals in cigarettes and cigars can cause lung and blood vessel damage  It also makes it more difficult to manage your diabetes  Ask your provider for information if you currently smoke and need help to quit  Do not use e-cigarettes or smokeless tobacco in place of cigarettes or to help you quit  They still contain nicotine   Check your feet each day for cuts, scratches, calluses, or other wounds  Look for redness and swelling, and feel for warmth  Wear shoes that fit well  Check your shoes for rocks or other objects that can hurt your feet  Do not walk barefoot or wear shoes without socks  Wear cotton socks to help keep your feet dry   Ask about vaccines you may need  You have a higher risk for serious illness if you get the flu, pneumonia, COVID-19, or hepatitis  Ask your provider if you should get vaccines to prevent these or other diseases, and when to get the vaccines   Talk to your provider if you become stressed about diabetes care  Sometimes being able to fit diabetes care into your life can cause increased stress  The stress can cause you not to take care of yourself properly  Your care team providers can help by offering tips about self-care   Your providers may suggest you talk to a mental health provider who can listen and offer help with self-care issues   Have your A1c checked as directed  Your provider may check your A1c every 3 months, or 2 times each year if your diabetes is controlled  An A1c test shows the average amount of sugar in your blood over the past 2 to 3 months  Your provider will tell you what your A1c level should be   Have screening tests as directed  Your provider may recommend screening for complications of diabetes and other conditions that may develop  Some screenings may begin right away and some may happen within the first 5 years of diagnosis:    ? Examples of diabetes complications  include kidney problems, high cholesterol, high blood pressure, blood vessel problems, eye problems, and sleep apnea  ? You may be screened for a low vitamin B level  if you take oral diabetes medicine for a long time  ? Women of childbearing years may be screened  for polycystic ovarian syndrome (PCOS)  Follow up with your doctor or diabetes care team providers as directed: You may need to have blood tests done before your follow-up visit  The test results will show if changes need to be made in your treatment or self-care  Talk to your provider if you cannot afford your medicine  Write down your questions so you remember to ask them during your visits  © Copyright [facility] 2022 Information is for End User's use only and may not be sold, redistributed or otherwise used for commercial purposes  The above information is an  only  It is not intended as medical advice for individual conditions or treatments  Talk to your doctor, nurse or pharmacist before following any medical regimen to see if it is safe and effective for you  Basic Carbohydrate Counting   AMBULATORY CARE:   Carbohydrate counting  is a way to plan your meals by counting the amount of carbohydrate in foods   Carbohydrates are the sugars, starches, and fiber found in fruit, grains, vegetables, and milk products  Carbohydrates increase your blood sugar levels  Carbohydrate counting can help you eat the right amount of carbohydrate to keep your blood sugar levels under control  What you need to know about planning meals using carbohydrate counting:   A dietitian or healthcare provider will help you develop a healthy meal plan that works best for you  You will be taught how much carbohydrate to eat or drink for each meal and snack  Your meal plan will be based on your age, weight, usual food intake, and physical activity level  If you have diabetes, it will also include your blood sugar levels and diabetes medicine  Once you know how much carbohydrate you should eat, you can decide what type of food you want to eat   You will need to know what foods contain carbohydrate and how much they contain  Keep track of the amount of carbohydrate in meals and snacks in order to follow your meal plan  Do not avoid carbohydrates or skip meals  Your blood sugar may fall too low if you do not eat enough carbohydrate or you skip meals  Foods that contain carbohydrate:    Breads:  Each serving of food listed below contains about 15 g of carbohydrate   ? 1 slice of bread (1 ounce) or 1 flour or corn tortilla (6 inch)    ? ½ of a hamburger bun or ¼ of a large bagel (about 1 ounce)    ? 1 pancake (about 4 inches across and ¼ inch thick)     Cereals and grains:  Serving sizes of ready-to-eat cereals vary  Look at the serving size and the total carbohydrate amount listed on the food label  Each serving of food listed below contains about 15 g of carbohydrate   ? ¾ cup of dry, unsweetened, ready-to-eat cereal or ¼ cup of low-fat granola     ? ½ cup of oatmeal or other cooked cereal     ? ? cup of cooked rice or pasta     Starchy vegetables and beans:  Each serving of food listed below contains about 15 g of carbohydrate   ? ½ cup of corn, green peas, sweet potatoes, or mashed potatoes    ?  ¼ of a large baked potato    ? ½ cup of beans, lentils, and peas (garbanzo, ortega, kidney, white, split, black-eyed)     Crackers and snacks:  Each serving of food listed below contains about 15 g of carbohydrate   ? 3 nafisa cracker squares or 8 animal crackers     ? 6 saltine-type crackers    ? 3 cups of popcorn or ¾ ounce of pretzels, potato chips, or tortilla chips     Fruit:  Each serving of food listed below contains about 15 g of carbohydrate   ? 1 small (4 ounce) piece of fresh fruit or ¾ to 1 cup of fresh fruit    ? ½ cup of canned or frozen fruit, packed in natural juice    ? ½ cup (4 ounces) of unsweetened fruit juice    ? 2 tablespoons of dried fruit     Desserts or sugary foods:  Each serving of food listed below contains about 15 g of carbohydrate   ? 2-inch square unfrosted cake or brownie     ? 2 small cookies    ? ½ cup of ice cream, frozen yogurt, or nondairy frozen yogurt    ? ¼ cup of sherbet or sorbet    ? 1 tablespoon of regular syrup, jam, or jelly    ? 2 tablespoons of light syrup     Milk and yogurt:  Foods from the milk group contain about 12 g of carbohydrate per serving  ? 1 cup of fat-free or low-fat milk    ? 1 cup of soy milk    ? ? cup of fat-free, yogurt sweetened with artificial sweetener     Non-starchy vegetables:  Each serving contains about 5 g of carbohydrate   Three servings of non-starch vegetables count as 1 carbohydrate serving  ? ½ cup of cooked vegetables or 1 cup of raw vegetables  This includes beets, broccoli, cabbage, cauliflower, cucumber, mushrooms, tomatoes, and zucchini    ? ½ cup of vegetable juice    How to use carbohydrate counting to plan meals:    Count carbohydrate amounts using serving sizes:      ? Pasta dinner example: You plan to have pasta, tossed salad, and an 8-ounce glass of milk  Your healthcare provider tells you that you may have 4 carbohydrate servings for dinner  One carbohydrate serving of pasta is ? cup   One cup of pasta will equal 3 carbohydrate servings  An 8-ounce glass of milk will count as 1 carbohydrate serving  These amounts of food would equal 4 carbohydrate servings  One cup of tossed salad does not count toward your carbohydrate servings   Count carbohydrate amounts using food labels:  Find the total amount of carbohydrate in a packaged food by reading the food label  Food labels tell you the serving size of the food and the total carbohydrate amount in each serving  Find the serving size on the food label and then decide how many servings you will eat  Multiply the number of servings you plan to eat by the carbohydrate amount per serving  ? Granola bar snack example: Your meal plan allows you to have 2 carbohydrate servings (30 grams) of carbohydrate for a snack  You plan to eat 1 package of granola bars, which contains 2 bars  According to the food label, the serving size of food in this package is 1 bar  Each serving (1 bar) contains 25 grams of carbohydrate  The total amount of carbohydrate in this package of granola bars would be 50 g  Based on your meal plan, you should eat only 1 bar  Follow up with your doctor as directed:  Write down your questions so you remember to ask them during your visits  © Copyright [employer] 2022 Information is for End User's use only and may not be sold, redistributed or otherwise used for commercial purposes  The above information is an  only  It is not intended as medical advice for individual conditions or treatments  Talk to your doctor, nurse or pharmacist before following any medical regimen to see if it is safe and effective for you  Chief Complaint     Chief Complaint   Patient presents with    Follow-up     Med check   Lab review         History of Present Illness       Patient comes in follow-up on recent labs  A1c markedly elevated at 13 4  She has been taking none of her medications    There is a family history of diabetes in her sibling  Patient had much lower A1c when taking her metformin  She wishes to intensify diet before starting additional medications  Review of Systems   Review of Systems   Constitutional: Negative for activity change, appetite change, diaphoresis and fatigue  HENT: Negative for congestion, sinus pressure and sore throat  Respiratory: Negative for cough, chest tightness, shortness of breath and wheezing  Cardiovascular: Negative for chest pain, palpitations and leg swelling  Fast or slow heart rate   Gastrointestinal: Negative for abdominal pain, blood in stool, constipation, diarrhea, nausea and vomiting  Genitourinary: Negative for difficulty urinating, dysuria, frequency and hematuria  Musculoskeletal: Negative for arthralgias, gait problem, joint swelling and myalgias  Neurological: Negative for dizziness, light-headedness and headaches  Psychiatric/Behavioral: Negative for agitation, confusion, dysphoric mood and sleep disturbance  The patient is not nervous/anxious            Current Medications       Current Outpatient Medications:     atorvastatin (LIPITOR) 10 mg tablet, Take 1 tablet (10 mg total) by mouth daily, Disp: 30 tablet, Rfl: 3    estradiol (ESTRACE) 0 5 MG tablet, Take 1 tablet (0 5 mg total) by mouth daily, Disp: 30 tablet, Rfl: 3    lisinopril (ZESTRIL) 2 5 mg tablet, Take 1 tablet (2 5 mg total) by mouth daily, Disp: 30 tablet, Rfl: 3    metFORMIN (GLUCOPHAGE-XR) 750 mg 24 hr tablet, Take 2 tablets (1,500 mg total) by mouth daily with breakfast, Disp: 60 tablet, Rfl: 3    Current Allergies     Allergies as of 05/01/2023    (No Known Allergies)            The following portions of the patient's history were reviewed and updated as appropriate: allergies, current medications, past family history, past medical history, past social history, past surgical history and problem list      Past Medical History:   Diagnosis Date    Acute pain of left shoulder 9/3/2019    Depression     Impingement syndrome of left shoulder 9/3/2019    Moderate protein-calorie malnutrition ([facility] [place] [address] ) 9/26/2018    Psychiatric disorder     Rib pain on left side 9/12/2019    Severe episode of recurrent major depressive disorder, without psychotic features ([facility] [address] ) 9/21/2018    Suicidal ideations 9/21/2018       Past Surgical History:   Procedure Laterality Date    FL INJECTION LEFT SHOULDER (ARTHROGRAM)  9/11/2019    HYSTERECTOMY      age 38    OOPHORECTOMY         Family History   Problem Relation Age of Onset    No Known Problems Mother     Cancer Father         throat    No Known Problems Sister     Cancer Maternal Grandmother         unknown primary or age of onset    No Known Problems Maternal Grandfather     Cancer Paternal Grandmother         unknown primary or age of onset    No Known Problems Paternal Grandfather     No Known Problems Sister     No Known Problems Sister     Ovarian cancer Maternal Aunt         late in life onset    Breast cancer Maternal Aunt         late in life onset    Cancer Maternal Aunt         unknown primary    Cancer Maternal Uncle     Cancer Maternal Uncle     Cancer Maternal Uncle     No Known Problems Maternal Uncle     No Known Problems Paternal Aunt          Medications have been verified  Objective   /69 (BP Location: Left arm, Patient Position: Sitting, Cuff Size: Standard)   Pulse 59   Temp (!) 96 4 °F (35 8 °C) (Tympanic)   Wt 60 3 kg (133 lb)   SpO2 97%   BMI 21 47 kg/m²        Physical Exam     Physical Exam  Vitals reviewed  Constitutional:       General: She is not in acute distress  Appearance: She is well-developed  She is not diaphoretic  HENT:      Head: Normocephalic and atraumatic  Right Ear: Tympanic membrane, ear canal and external ear normal       Left Ear: Tympanic membrane, ear canal and external ear normal       Nose: Nose normal  No mucosal edema or rhinorrhea        Right Sinus: No maxillary sinus tenderness  Left Sinus: No maxillary sinus tenderness  Mouth/Throat:      Mouth: Mucous membranes are not pale and not dry  Dentition: Normal dentition  Pharynx: Uvula midline  No oropharyngeal exudate  Eyes:      General:         Right eye: No discharge  Left eye: No discharge  Extraocular Movements: Extraocular movements intact  Conjunctiva/sclera: Conjunctivae normal       Pupils: Pupils are equal, round, and reactive to light  Neck:      Thyroid: No thyromegaly  Cardiovascular:      Rate and Rhythm: Normal rate and regular rhythm  Heart sounds: Normal heart sounds  No murmur heard  Pulmonary:      Effort: Pulmonary effort is normal  No respiratory distress  Breath sounds: Normal breath sounds  No wheezing or rales  Musculoskeletal:      Cervical back: Normal range of motion and neck supple  Right lower leg: No edema  Left lower leg: No edema  Lymphadenopathy:      Cervical: No cervical adenopathy  Neurological:      Mental Status: She is alert and oriented to person, place, and time     Psychiatric:         Mood and Affect: Mood normal          Behavior: Behavior normal

## 2023-05-01 NOTE — PATIENT INSTRUCTIONS
Restart medications as ordered  Repeat labs in 3 months with office visit after  Consider diabetic teaching  Follow-up with dentist in  [place] this for good mouth care  Schedule your mammogram   My staff will order a glucometer  You should be checking fasting sugars at least 3-4 times per week  Goal should be less than 150  Type 2 Diabetes Management for Adults   AMBULATORY CARE:   Type 2 diabetes  is a disease that affects how your body uses glucose (sugar)  Either your body cannot make enough insulin, or it cannot use the insulin correctly  It is important to keep diabetes controlled to prevent damage to your heart, blood vessels, and other organs  Management will help you feel well and enjoy your daily activities  Your diabetes care team providers can help you make a plan to fit diabetes care into your schedule  Your plan can change over time to fit your needs and your family's needs  Have someone call your local emergency number (911 in the [address] [facility]) if:   You cannot be woken  You have signs of diabetic ketoacidosis:     confusion, fatigue    vomiting    rapid heartbeat    fruity smelling breath    extreme thirst    dry mouth and skin    You have any of the following signs of a heart attack:      Squeezing, pressure, or pain in your chest    You may  also have any of the following:     Discomfort or pain in your back, neck, jaw, stomach, or arm    Shortness of breath    Nausea or vomiting    Lightheadedness or a sudden cold sweat    You have any of the following signs of a stroke:      Numbness or drooping on one side of your face     Weakness in an arm or leg    Confusion or difficulty speaking    Dizziness, a severe headache, or vision loss    Call your doctor or diabetes care team provider if:   You have a sore or wound that will not heal     You have a change in the amount you urinate  Your blood sugar levels are higher than your target goals      You often have lower blood sugar levels than your target goals  Your skin is red, dry, warm, or swollen  You have trouble coping with diabetes, or you feel anxious or depressed  You have questions or concerns about your condition or care  What you need to know about high blood sugar levels:  High blood sugar levels may not cause any symptoms  You may feel more thirsty or urinate more often than usual  Over time, high blood sugar levels can damage your nerves, blood vessels, tissues, and organs  The following can increase your blood sugar levels:  Large meals or large amounts of carbohydrates at one time    Less physical activity    Stress    Illness    A lower dose of diabetes medicine or insulin, or a late dose    What you need to know about low blood sugar levels:  Symptoms include feeling shaky, dizzy, irritable, or confused  You can prevent symptoms by keeping your blood sugar levels from going too low  Treat a low blood sugar level right away:      Drink 4 ounces of juice or have 1 tube of glucose gel  Check your blood sugar level again 10 to 15 minutes later  When the level goes back to normal, eat a meal or snack to prevent another decrease  Keep glucose gel, raisins, or hard candy with you at all times to treat a low blood sugar level  Your blood sugar level can get too low if you take diabetes medicine or insulin and do not eat enough food  If you use insulin, check your blood sugar level before you exercise  If your blood sugar level is below 100 mg/dL, eat 4 crackers or 2 ounces of raisins, or drink 4 ounces of juice  Check your level every 30 minutes if you exercise longer than 1 hour  You may need a snack during or after exercise  What you can do to manage your blood sugar levels:   Check your blood sugar levels as directed and as needed  Several items are available to use to check your levels  You may need to check by testing a drop of blood in a glucose monitor   You may instead be given a continuous glucose monitoring (CGM) device  The device is worn at all times  The CGM checks your blood sugar level every 5 minutes  It sends results to an electronic device such as a smart phone  A CGM can be used with or without an insulin pump  You and your diabetes care team providers will decide on the best method for you  The goal for blood sugar levels before meals  is between 80 and 130 mg/dL and 2 hours after eating  is lower than 180 mg/dL  Make healthy food choices  Work with a dietitian to develop a meal plan that works for you and your schedule  A dietitian can help you learn how to eat the right amount of carbohydrates during your meals and snacks  Carbohydrates can raise your blood sugar level if you eat too many at one time  Examples of foods that contain carbohydrates are breads, cereals, rice, pasta, and sweets  Eat high-fiber foods as directed  Fiber helps improve blood sugar levels  Fiber also lowers your risk for heart disease and other problems diabetes can cause  Examples of high-fiber foods include vegetables, whole-grain bread, and beans such as ortega beans  Your dietitian can tell you how much fiber to have each day  Get regular physical activity  Physical activity can help you get to your target blood sugar level goal and manage your weight  Get at least 150 minutes of moderate to vigorous aerobic physical activity each week  Do not miss more than 2 days in a row  Do not sit longer than 30 minutes at a time  Your healthcare provider can help you create an activity plan  The plan can include the best activities for you and can help you build your strength and endurance  Maintain a healthy weight  Ask your team what a healthy weight is for you  A healthy weight can help you control diabetes and prevent heart disease  Ask your team to help you create a weight loss plan, if needed  Weight loss can help make a difference in managing diabetes   Your team will help you set a weight-loss goal, such as 10 to 15 pounds, or 5% of your extra weight  Together you and your team can set manageable weight loss goals  Take your diabetes medicine or insulin as directed  You may need diabetes medicine, insulin, or both to help control your blood sugar levels  Your healthcare provider will teach you how and when to take your diabetes medicine or insulin  You will also be taught about side effects oral diabetes medicine can cause  Insulin may be injected or given through a pump or pen  You and your providers will decide on the best method for you: An insulin pump  is an implanted device that gives your insulin 24 hours a day  An insulin pump prevents the need for multiple insulin injections in a day  An insulin pen  is a device prefilled with the right amount of insulin  You and your family members will be taught how to draw up and give insulin  if this is the best method for you  Your providers will also teach you how to dispose of needles and syringes  You will learn how much insulin you need  and when to give it  You will be taught when not to give insulin  You will also be taught what to do if your blood sugar level drops too low  This may happen if you take insulin and do not eat the right amount of carbohydrates  More ways to manage type 2 diabetes:   Wear medical alert identification  Wear medical alert jewelry or carry a card that says you have diabetes  Ask your provider where to get these items  Do not smoke  Nicotine and other chemicals in cigarettes and cigars can cause lung and blood vessel damage  It also makes it more difficult to manage your diabetes  Ask your provider for information if you currently smoke and need help to quit  Do not use e-cigarettes or smokeless tobacco in place of cigarettes or to help you quit  They still contain nicotine  Check your feet each day for cuts, scratches, calluses, or other wounds    Look for redness and swelling, and feel for warmth  Wear shoes that fit well  Check your shoes for rocks or other objects that can hurt your feet  Do not walk barefoot or wear shoes without socks  Wear cotton socks to help keep your feet dry  Ask about vaccines you may need  You have a higher risk for serious illness if you get the flu, pneumonia, COVID-19, or hepatitis  Ask your provider if you should get vaccines to prevent these or other diseases, and when to get the vaccines  Talk to your provider if you become stressed about diabetes care  Sometimes being able to fit diabetes care into your life can cause increased stress  The stress can cause you not to take care of yourself properly  Your care team providers can help by offering tips about self-care  Your providers may suggest you talk to a mental health provider who can listen and offer help with self-care issues  Have your A1c checked as directed  Your provider may check your A1c every 3 months, or 2 times each year if your diabetes is controlled  An A1c test shows the average amount of sugar in your blood over the past 2 to 3 months  Your provider will tell you what your A1c level should be  Have screening tests as directed  Your provider may recommend screening for complications of diabetes and other conditions that may develop  Some screenings may begin right away and some may happen within the first 5 years of diagnosis:    Examples of diabetes complications  include kidney problems, high cholesterol, high blood pressure, blood vessel problems, eye problems, and sleep apnea  You may be screened for a low vitamin B level  if you take oral diabetes medicine for a long time  Women of childbearing years may be screened  for polycystic ovarian syndrome (PCOS)  Follow up with your doctor or diabetes care team providers as directed: You may need to have blood tests done before your follow-up visit   The test results will show if changes need to be made in your treatment or self-care  Talk to your provider if you cannot afford your medicine  Write down your questions so you remember to ask them during your visits  © Copyright Denise Gale 2022 Information is for End User's use only and may not be sold, redistributed or otherwise used for commercial purposes  The above information is an  only  It is not intended as medical advice for individual conditions or treatments  Talk to your doctor, nurse or pharmacist before following any medical regimen to see if it is safe and effective for you  Basic Carbohydrate Counting   AMBULATORY CARE:   Carbohydrate counting  is a way to plan your meals by counting the amount of carbohydrate in foods  Carbohydrates are the sugars, starches, and fiber found in fruit, grains, vegetables, and milk products  Carbohydrates increase your blood sugar levels  Carbohydrate counting can help you eat the right amount of carbohydrate to keep your blood sugar levels under control  What you need to know about planning meals using carbohydrate counting:  A dietitian or healthcare provider will help you develop a healthy meal plan that works best for you  You will be taught how much carbohydrate to eat or drink for each meal and snack  Your meal plan will be based on your age, weight, usual food intake, and physical activity level  If you have diabetes, it will also include your blood sugar levels and diabetes medicine  Once you know how much carbohydrate you should eat, you can decide what type of food you want to eat  You will need to know what foods contain carbohydrate and how much they contain  Keep track of the amount of carbohydrate in meals and snacks in order to follow your meal plan  Do not avoid carbohydrates or skip meals  Your blood sugar may fall too low if you do not eat enough carbohydrate or you skip meals      Foods that contain carbohydrate:   Breads:  Each serving of food listed below contains about 15 g of carbohydrate   1 slice of bread (1 ounce) or 1 flour or corn tortilla (6 inch)    ½ of a hamburger bun or ¼ of a large bagel (about 1 ounce)    1 pancake (about 4 inches across and ¼ inch thick)    Cereals and grains:  Serving sizes of ready-to-eat cereals vary  Look at the serving size and the total carbohydrate amount listed on the food label  Each serving of food listed below contains about 15 g of carbohydrate   ¾ cup of dry, unsweetened, ready-to-eat cereal or ¼ cup of low-fat granola     ½ cup of oatmeal or other cooked cereal     ? cup of cooked rice or pasta    Starchy vegetables and beans:  Each serving of food listed below contains about 15 g of carbohydrate   ½ cup of corn, green peas, sweet potatoes, or mashed potatoes    ¼ of a large baked potato    ½ cup of beans, lentils, and peas (garbanzo, ortega, kidney, white, split, black-eyed)    Crackers and snacks:  Each serving of food listed below contains about 15 g of carbohydrate   3 nafisa cracker squares or 8 animal crackers     6 saltine-type crackers    3 cups of popcorn or ¾ ounce of pretzels, potato chips, or tortilla chips    Fruit:  Each serving of food listed below contains about 15 g of carbohydrate   1 small (4 ounce) piece of fresh fruit or ¾ to 1 cup of fresh fruit    ½ cup of canned or frozen fruit, packed in natural juice    ½ cup (4 ounces) of unsweetened fruit juice    2 tablespoons of dried fruit    Desserts or sugary foods:  Each serving of food listed below contains about 15 g of carbohydrate   2-inch square unfrosted cake or brownie     2 small cookies    ½ cup of ice cream, frozen yogurt, or nondairy frozen yogurt    ¼ cup of sherbet or sorbet    1 tablespoon of regular syrup, jam, or jelly    2 tablespoons of light syrup    Milk and yogurt:  Foods from the milk group contain about 12 g of carbohydrate per serving  1 cup of fat-free or low-fat milk    1 cup of soy milk    ?  cup of fat-free, yogurt sweetened with artificial sweetener    Non-starchy vegetables:  Each serving contains about 5 g of carbohydrate   Three servings of non-starch vegetables count as 1 carbohydrate serving  ½ cup of cooked vegetables or 1 cup of raw vegetables  This includes beets, broccoli, cabbage, cauliflower, cucumber, mushrooms, tomatoes, and zucchini    ½ cup of vegetable juice    How to use carbohydrate counting to plan meals:   Count carbohydrate amounts using serving sizes:      Pasta dinner example: You plan to have pasta, tossed salad, and an 8-ounce glass of milk  Your healthcare provider tells you that you may have 4 carbohydrate servings for dinner  One carbohydrate serving of pasta is ? cup  One cup of pasta will equal 3 carbohydrate servings  An 8-ounce glass of milk will count as 1 carbohydrate serving  These amounts of food would equal 4 carbohydrate servings  One cup of tossed salad does not count toward your carbohydrate servings  Count carbohydrate amounts using food labels:  Find the total amount of carbohydrate in a packaged food by reading the food label  Food labels tell you the serving size of the food and the total carbohydrate amount in each serving  Find the serving size on the food label and then decide how many servings you will eat  Multiply the number of servings you plan to eat by the carbohydrate amount per serving  Granola bar snack example: Your meal plan allows you to have 2 carbohydrate servings (30 grams) of carbohydrate for a snack  You plan to eat 1 package of granola bars, which contains 2 bars  According to the food label, the serving size of food in this package is 1 bar  Each serving (1 bar) contains 25 grams of carbohydrate  The total amount of carbohydrate in this package of granola bars would be 50 g  Based on your meal plan, you should eat only 1 bar        Follow up with your doctor as directed:  Write down your questions so you remember to ask them during your visits  © Copyright Roxana Valero 2022 Information is for End User's use only and may not be sold, redistributed or otherwise used for commercial purposes  The above information is an  only  It is not intended as medical advice for individual conditions or treatments  Talk to your doctor, nurse or pharmacist before following any medical regimen to see if it is safe and effective for you

## 2023-07-17 ENCOUNTER — HOSPITAL ENCOUNTER (OUTPATIENT)
Dept: MAMMOGRAPHY | Facility: IMAGING CENTER | Age: 63
Discharge: HOME | End: 2023-07-17
Payer: COMMERCIAL

## 2023-07-17 VITALS — WEIGHT: 133 LBS | BODY MASS INDEX: 21.38 KG/M2 | HEIGHT: 66 IN

## 2023-07-17 DIAGNOSIS — Z12.31 SCREENING MAMMOGRAM, ENCOUNTER FOR: ICD-10-CM

## 2023-07-17 PROCEDURE — 77063 BREAST TOMOSYNTHESIS BI: CPT

## 2023-07-17 PROCEDURE — 77067 SCR MAMMO BI INCL CAD: CPT

## 2024-05-22 ENCOUNTER — TELEPHONE (OUTPATIENT)
Dept: FAMILY MEDICINE CLINIC | Facility: CLINIC | Age: 64
End: 2024-05-22

## 2024-10-10 ENCOUNTER — HOSPITAL ENCOUNTER (OUTPATIENT)
Dept: MAMMOGRAPHY | Facility: IMAGING CENTER | Age: 64
Discharge: HOME | End: 2024-10-10
Payer: COMMERCIAL

## 2024-10-10 VITALS — WEIGHT: 130 LBS | HEIGHT: 66 IN | BODY MASS INDEX: 20.89 KG/M2

## 2024-10-10 DIAGNOSIS — Z12.31 ENCOUNTER FOR SCREENING MAMMOGRAM FOR MALIGNANT NEOPLASM OF BREAST: ICD-10-CM

## 2024-10-10 PROCEDURE — 77063 BREAST TOMOSYNTHESIS BI: CPT

## 2024-10-10 PROCEDURE — 77067 SCR MAMMO BI INCL CAD: CPT

## 2025-01-28 ENCOUNTER — OFFICE VISIT (OUTPATIENT)
Dept: FAMILY MEDICINE CLINIC | Facility: CLINIC | Age: 65
End: 2025-01-28
Payer: COMMERCIAL

## 2025-01-28 VITALS
SYSTOLIC BLOOD PRESSURE: 136 MMHG | DIASTOLIC BLOOD PRESSURE: 84 MMHG | WEIGHT: 122 LBS | OXYGEN SATURATION: 99 % | BODY MASS INDEX: 19.69 KG/M2 | HEART RATE: 75 BPM

## 2025-01-28 DIAGNOSIS — J01.00 ACUTE NON-RECURRENT MAXILLARY SINUSITIS: Primary | ICD-10-CM

## 2025-01-28 DIAGNOSIS — Z12.11 SCREENING FOR COLORECTAL CANCER: ICD-10-CM

## 2025-01-28 DIAGNOSIS — E11.69 HYPERLIPIDEMIA ASSOCIATED WITH TYPE 2 DIABETES MELLITUS  (HCC): ICD-10-CM

## 2025-01-28 DIAGNOSIS — E78.5 HYPERLIPIDEMIA ASSOCIATED WITH TYPE 2 DIABETES MELLITUS  (HCC): ICD-10-CM

## 2025-01-28 DIAGNOSIS — Z00.00 ANNUAL PHYSICAL EXAM: ICD-10-CM

## 2025-01-28 DIAGNOSIS — Z12.12 SCREENING FOR COLORECTAL CANCER: ICD-10-CM

## 2025-01-28 DIAGNOSIS — E11.9 TYPE 2 DIABETES MELLITUS WITHOUT COMPLICATION, WITHOUT LONG-TERM CURRENT USE OF INSULIN (HCC): ICD-10-CM

## 2025-01-28 LAB
LEFT EYE DIABETIC RETINOPATHY: NORMAL
LEFT EYE IMAGE QUALITY: NORMAL
LEFT EYE MACULAR EDEMA: NORMAL
LEFT EYE OTHER RETINOPATHY: NORMAL
RIGHT EYE DIABETIC RETINOPATHY: NORMAL
RIGHT EYE IMAGE QUALITY: NORMAL
RIGHT EYE MACULAR EDEMA: NORMAL
RIGHT EYE OTHER RETINOPATHY: NORMAL
SEVERITY (EYE EXAM): NORMAL
SL AMB POCT HEMOGLOBIN AIC: 8.8 (ref ?–6.5)

## 2025-01-28 PROCEDURE — 99214 OFFICE O/P EST MOD 30 MIN: CPT | Performed by: NURSE PRACTITIONER

## 2025-01-28 PROCEDURE — 83036 HEMOGLOBIN GLYCOSYLATED A1C: CPT | Performed by: NURSE PRACTITIONER

## 2025-01-28 PROCEDURE — 99396 PREV VISIT EST AGE 40-64: CPT | Performed by: NURSE PRACTITIONER

## 2025-01-28 RX ORDER — AZITHROMYCIN 250 MG/1
TABLET, FILM COATED ORAL
Qty: 6 TABLET | Refills: 0 | Status: SHIPPED | OUTPATIENT
Start: 2025-01-28 | End: 2025-02-02

## 2025-01-28 RX ORDER — BENZONATATE 200 MG/1
200 CAPSULE ORAL 3 TIMES DAILY PRN
Qty: 20 CAPSULE | Refills: 0 | Status: SHIPPED | OUTPATIENT
Start: 2025-01-28

## 2025-01-28 NOTE — PROGRESS NOTES
Adult Annual Physical  Name: Virginia Maradiaga      : 1960      MRN: [id number]  Encounter Provider: SHIKHA Arrington  Encounter Date: 2025   Encounter department: [facility]    Assessment & Plan  Acute non-recurrent maxillary sinusitis    Orders:    azithromycin (Zithromax) 250 mg tablet; Take 2 tablets (500 mg total) by mouth daily for 1 day, THEN 1 tablet (250 mg total) daily for 4 days.    benzonatate (TESSALON) 200 MG capsule; Take 1 capsule (200 mg total) by mouth 3 (three) times a day as needed for cough    Annual physical exam  Due annual physical today. C/o cold symptoms. Patient not seen since 23. She is due labs. Medications reviewed.     Orders:    Lipid panel; Future    Comprehensive metabolic panel; Future    Type 2 diabetes mellitus without complication, without long-term current use of insulin (HCC)    Lab Results   Component Value Date    HGBA1C 8.8 (A) 2025     A1c 8.8 today. Previous A1c from 2023 was 13.0. She was prescribed metformin which she states she does not take. Diabetic education provided. Discussed the importance of medication compliance along with lifestyle modifications. Patient is declining medications at this time. She states she make changes and will improve her diet a later time.   F/up in 3 months. Will recheck A1c in 3 months.      Orders:    [facility] Diabetic eye exam    POCT hemoglobin A1c    Microalbumin, Random Urine (W/Creatinine)    Hyperlipidemia associated with type 2 diabetes mellitus  (HCC)    Lab Results   Component Value Date    HGBA1C 8.8 (A) 2025     Lipids last checked 2023. Triglycerides 179, . Patient was prescribed atorvastatin previously but is refusing medications. Lifestyle modifications reviewed. Fasting labs ordered.         Screening for colorectal cancer    Orders:    Cologuard    Immunizations and preventive care screenings were discussed with patient today. Appropriate education was  printed on patient's after visit summary.    Counseling:  Alcohol/drug use: discussed moderation in alcohol intake, the recommendations for healthy alcohol use, and avoidance of illicit drug use.  Dental Health: discussed importance of regular tooth brushing, flossing, and dental visits.  Injury prevention: discussed safety/seat belts, safety helmets, smoke detectors, carbon monoxide detectors, and smoking near bedding or upholstery.  Sexual health: discussed sexually transmitted diseases, partner selection, use of condoms, avoidance of unintended pregnancy, and contraceptive alternatives.  Exercise: the importance of regular exercise/physical activity was discussed. Recommend exercise 3-5 times per week for at least 30 minutes.       Depression Screening and Follow-up Plan: Patient was screened for depression during today's encounter. They screened negative with a PHQ-9 score of 0.    Tobacco Cessation Counseling: Tobacco cessation counseling was provided. The patient is sincerely urged to quit consumption of tobacco. She is not ready to quit tobacco.         History of Present Illness     Adult Annual Physical:  Patient presents for annual physical. C/o sinus pressure, cough, congestion x 2 weeks. Taking otc cold medications with mild relief.  Patient is due annual physical and medical management. .     Diet and Physical Activity:  - Diet/Nutrition: well balanced diet.  - Exercise: walking.    Depression Screening:    - PHQ-9 Score: 0    General Health:  - Sleep: sleeps well.  - Hearing: normal hearing bilateral ears.  - Vision: goes for regular eye exams.  - Dental: regular dental visits.    /GYN Health:  - Follows with GYN: no.   - Menopause: postmenopausal.     Advanced Care Planning:  - Has an advanced directive?: no    - Has a durable medical POA?: no    - ACP document given to patient?: yes      Review of Systems   Constitutional:  Negative for activity change, appetite change, chills, diaphoresis,  fatigue, fever and unexpected weight change.   HENT:  Positive for ear pain, postnasal drip, rhinorrhea and sinus pain. Negative for congestion, dental problem, drooling, ear discharge, facial swelling, hearing loss, mouth sores, nosebleeds, sinus pressure, sneezing, sore throat, tinnitus, trouble swallowing and voice change.    Eyes:  Negative for photophobia, pain, discharge, redness, itching and visual disturbance.   Respiratory:  Positive for cough. Negative for apnea, choking, chest tightness, shortness of breath, wheezing and stridor.    Cardiovascular:  Negative for chest pain, palpitations and leg swelling.   Gastrointestinal:  Negative for abdominal distention, abdominal pain, anal bleeding, blood in stool, constipation, diarrhea, nausea, rectal pain and vomiting.   Endocrine: Negative for cold intolerance, heat intolerance, polydipsia, polyphagia and polyuria.   Genitourinary:  Negative for decreased urine volume, difficulty urinating, dyspareunia, dysuria, enuresis, flank pain, frequency, genital sores, hematuria, menstrual problem, pelvic pain, urgency, vaginal bleeding, vaginal discharge and vaginal pain.   Musculoskeletal:  Negative for arthralgias, back pain, gait problem, joint swelling, myalgias, neck pain and neck stiffness.   Skin:  Negative for color change, pallor, rash and wound.   Allergic/Immunologic: Negative for environmental allergies, food allergies and immunocompromised state.   Neurological:  Positive for headaches. Negative for dizziness, tremors, seizures, syncope, facial asymmetry, speech difficulty, weakness, light-headedness and numbness.   Hematological:  Negative for adenopathy. Does not bruise/bleed easily.   Psychiatric/Behavioral:  Negative for agitation, behavioral problems, confusion, decreased concentration, dysphoric mood, hallucinations, self-injury, sleep disturbance and suicidal ideas. The patient is not nervous/anxious and is not hyperactive.      Current Outpatient  Medications on File Prior to Visit   Medication Sig Dispense Refill    atorvastatin (LIPITOR) 10 mg tablet Take 1 tablet (10 mg total) by mouth daily (Patient not taking: Reported on 1/28/2025) 30 tablet 3    metFORMIN (GLUCOPHAGE-XR) 750 mg 24 hr tablet Take 2 tablets (1,500 mg total) by mouth daily with breakfast 60 tablet 3    [DISCONTINUED] estradiol (ESTRACE) 0.5 MG tablet Take 1 tablet (0.5 mg total) by mouth daily (Patient not taking: Reported on 1/28/2025) 30 tablet 3    [DISCONTINUED] lisinopril (ZESTRIL) 2.5 mg tablet Take 1 tablet (2.5 mg total) by mouth daily (Patient not taking: Reported on 1/28/2025) 30 tablet 3     No current facility-administered medications on file prior to visit.        Objective   /84 (BP Location: Left arm, Patient Position: Sitting, Cuff Size: Standard)   Pulse 75   Wt 55.3 kg (122 lb)   SpO2 99%   BMI 19.69 kg/m²     Physical Exam  Vitals and nursing note reviewed.   Constitutional:       General: She is not in acute distress.     Appearance: Normal appearance. She is well-developed and normal weight. She is not ill-appearing, toxic-appearing or diaphoretic.   HENT:      Head: Normocephalic and atraumatic.      Right Ear: Tympanic membrane, ear canal and external ear normal. There is no impacted cerumen.      Left Ear: Tympanic membrane, ear canal and external ear normal. There is no impacted cerumen.      Nose: Congestion and rhinorrhea present.      Right Sinus: Maxillary sinus tenderness present.      Left Sinus: Maxillary sinus tenderness present.      Mouth/Throat:      Mouth: Mucous membranes are moist.      Pharynx: Oropharynx is clear. Uvula midline. No oropharyngeal exudate or posterior oropharyngeal erythema.   Eyes:      General: No scleral icterus.        Right eye: No discharge.         Left eye: No discharge.      Extraocular Movements: Extraocular movements intact.      Conjunctiva/sclera: Conjunctivae normal.      Pupils: Pupils are equal, round, and  reactive to light.   Neck:      Vascular: No carotid bruit.   Cardiovascular:      Rate and Rhythm: Normal rate and regular rhythm.      Pulses: no weak pulses.           Dorsalis pedis pulses are 1+ on the right side and 1+ on the left side.      Heart sounds: Normal heart sounds. No murmur heard.     No friction rub. No gallop.   Pulmonary:      Effort: Pulmonary effort is normal. No respiratory distress.      Breath sounds: Normal breath sounds. No stridor. No wheezing, rhonchi or rales.      Comments: Cough present  Chest:      Chest wall: No tenderness.   Abdominal:      General: Abdomen is flat. Bowel sounds are normal. There is no distension.      Palpations: Abdomen is soft. There is no mass.      Tenderness: There is no abdominal tenderness. There is no right CVA tenderness, left CVA tenderness, guarding or rebound.      Hernia: No hernia is present.   Musculoskeletal:         General: No swelling, tenderness, deformity or signs of injury. Normal range of motion.      Cervical back: Normal range of motion and neck supple. No rigidity or tenderness. No muscular tenderness.      Right lower leg: No edema.      Left lower leg: No edema.   Feet:      Right foot:      Skin integrity: Dry skin present. No ulcer, skin breakdown, erythema, warmth or callus.      Left foot:      Skin integrity: Dry skin present. No ulcer, skin breakdown, erythema, warmth or callus.   Lymphadenopathy:      Cervical: No cervical adenopathy.   Skin:     General: Skin is warm.      Capillary Refill: Capillary refill takes less than 2 seconds.      Coloration: Skin is not jaundiced or pale.      Findings: No bruising, erythema, lesion or rash.   Neurological:      General: No focal deficit present.      Mental Status: She is alert and oriented to person, place, and time.      Cranial Nerves: No cranial nerve deficit.      Sensory: No sensory deficit.      Motor: No weakness.      Coordination: Coordination normal.      Gait: Gait normal.       Deep Tendon Reflexes: Reflexes normal.   Psychiatric:         Mood and Affect: Mood normal.         Behavior: Behavior normal.         Thought Content: Thought content normal.         Judgment: Judgment normal.     Patient's shoes and socks removed.    Right Foot/Ankle   Right Foot Inspection  Skin Exam: skin intact and dry skin. No warmth, no callus, no erythema, no maceration, no abnormal color, no pre-ulcer, no ulcer and no callus.     Toe Exam: ROM and strength within normal limits and right toe deformity (Onychomycosis). No swelling, no tenderness and erythema    Sensory   Monofilament testing: intact    Vascular  Capillary refills: elevated  The right DP pulse is 1+.     Left Foot/Ankle  Left Foot Inspection  Skin Exam: skin intact and dry skin. No warmth, no erythema, no maceration, normal color, no pre-ulcer, no ulcer and no callus.     Toe Exam: ROM and strength within normal limits and left toe deformity (Onychomycosis). No swelling, no tenderness and no erythema.     Sensory   Monofilament testing: intact    Vascular  Capillary refills: elevated  The left DP pulse is 1+.     Assign Risk Category  Deformity present  No loss of protective sensation  No weak pulses  Risk: 0

## 2025-01-28 NOTE — ASSESSMENT & PLAN NOTE
Lab Results   Component Value Date    HGBA1C 8.8 (A) 01/28/2025     A1c 8.8 today. Previous A1c from 5/2023 was 13.0. She was prescribed metformin which she states she does not take. Diabetic education provided. Discussed the importance of medication compliance along with lifestyle modifications. Patient is declining medications at this time. She states she make changes and will improve her diet a later time.   F/up in 3 months. Will recheck A1c in 3 months.      Orders:    [facility] Diabetic eye exam    POCT hemoglobin A1c    Microalbumin, Random Urine (W/Creatinine)

## 2025-01-28 NOTE — ASSESSMENT & PLAN NOTE
Lab Results   Component Value Date    HGBA1C 8.8 (A) 01/28/2025     Lipids last checked 5/2023. Triglycerides 179, . Patient was prescribed atorvastatin previously but is refusing medications. Lifestyle modifications reviewed. Fasting labs ordered.

## 2025-01-28 NOTE — PATIENT INSTRUCTIONS
"Discussed viral vs bacterial infection  RX as ordered  Continue OTC cough/cold medications as directed  Call or return for problems/concerns  Have labs completed as ordered  Discussed lifestyle modifications for diabetes management.     Patient Education     Routine physical for adults   The Basics   Written by the doctors and editors at [facility]   What is a physical? -- A physical is a routine visit, or \"check-up,\" with your doctor. You might also hear it called a \"wellness visit\" or \"preventive visit.\"  During each visit, the doctor will:   Ask about your physical and mental health   Ask about your habits, behaviors, and lifestyle   Do an exam   Give you vaccines if needed   Talk to you about any medicines you take   Give advice about your health   Answer your questions  Getting regular check-ups is an important part of taking care of your health. It can help your doctor find and treat any problems you have. But it's also important for preventing health problems.  A routine physical is different from a \"sick visit.\" A sick visit is when you see a doctor because of a health concern or problem. Since physicals are scheduled ahead of time, you can think about what you want to ask the doctor.  How often should I get a physical? -- It depends on your age and health. In general, for people age 21 years and older:   If you are younger than 50 years, you might be able to get a physical every 3 years.   If you are 50 years or older, your doctor might recommend a physical every year.  If you have an ongoing health condition, like diabetes or high blood pressure, your doctor will probably want to see you more often.  What happens during a physical? -- In general, each visit will include:   Physical exam - The doctor or nurse will check your height, weight, heart rate, and blood pressure. They will also look at your eyes and ears. They will ask about how you are feeling and whether you have any symptoms that bother you.   " "Medicines - It's a good idea to bring a list of all the medicines you take to each doctor visit. Your doctor will talk to you about your medicines and answer any questions. Tell them if you are having any side effects that bother you. You should also tell them if you are having trouble paying for any of your medicines.   Habits and behaviors - This includes:   Your diet   Your exercise habits   Whether you smoke, drink alcohol, or use drugs   Whether you are sexually active   Whether you feel safe at home  Your doctor will talk to you about things you can do to improve your health and lower your risk of health problems. They will also offer help and support. For example, if you want to quit smoking, they can give you advice and might prescribe medicines. If you want to improve your diet or get more physical activity, they can help you with this, too.   Lab tests, if needed - The tests you get will depend on your age and situation. For example, your doctor might want to check your:   Cholesterol   Blood sugar   Iron level   Vaccines - The recommended vaccines will depend on your age, health, and what vaccines you already had. Vaccines are very important because they can prevent certain serious or deadly infections.   Discussion of screening - \"Screening\" means checking for diseases or other health problems before they cause symptoms. Your doctor can recommend screening based on your age, risk, and preferences. This might include tests to check for:   Cancer, such as breast, prostate, cervical, ovarian, colorectal, prostate, lung, or skin cancer   Sexually transmitted infections, such as chlamydia and gonorrhea   Mental health conditions like depression and anxiety  Your doctor will talk to you about the different types of screening tests. They can help you decide which screenings to have. They can also explain what the results might mean.   Answering questions - The physical is a good time to ask the doctor or nurse " questions about your health. If needed, they can refer you to other doctors or specialists, too.  Adults older than 65 years often need other care, too. As you get older, your doctor will talk to you about:   How to prevent falling at home   Hearing or vision tests   Memory testing   How to take your medicines safely   Making sure that you have the help and support you need at home  All topics are updated as new evidence becomes available and our peer review process is complete.  This topic retrieved from [employer] on: May 02, 2024.  Topic 832869 Version 1.0  Release: 32.4.3 - C32.122  © 2024 Up[employer]Date, Inc. and/or its affiliates. All rights reserved.  Consumer Information Use and Disclaimer   Disclaimer: This generalized information is a limited summary of diagnosis, treatment, and/or medication information. It is not meant to be comprehensive and should be used as a tool to help the user understand and/or assess potential diagnostic and treatment options. It does NOT include all information about conditions, treatments, medications, side effects, or risks that may apply to a specific patient. It is not intended to be medical advice or a substitute for the medical advice, diagnosis, or treatment of a health care provider based on the health care provider's examination and assessment of a patient's specific and unique circumstances. Patients must speak with a health care provider for complete information about their health, medical questions, and treatment options, including any risks or benefits regarding use of medications. This information does not endorse any treatments or medications as safe, effective, or approved for treating a specific patient. Up[employer]Date, Inc. and its affiliates disclaim any warranty or liability relating to this information or the use thereof.The use of this information is governed by the Terms of Use, available at https://www.woltersFitnessKeeperuwer.com/en/know/clinical-effectiveness-terms. 2024©  [employer] and its affiliates and/or licensors. All rights reserved.  Copyright   © 2024 [employer] and/or its affiliates. All rights reserved.

## 2025-01-29 LAB
ALBUMIN/CREAT UR: NORMAL MG/G CREAT
CREAT UR-MCNC: 20 MG/DL (ref 20–275)
MICROALBUMIN UR-MCNC: <0.2 MG/DL

## 2025-02-15 LAB — COLOGUARD RESULT REPORTABLE: NEGATIVE

## 2025-02-17 ENCOUNTER — RESULTS FOLLOW-UP (OUTPATIENT)
Dept: FAMILY MEDICINE CLINIC | Facility: CLINIC | Age: 65
End: 2025-02-17

## 2025-05-05 ENCOUNTER — NURSE TRIAGE (OUTPATIENT)
Age: 65
End: 2025-05-05

## 2025-05-06 ENCOUNTER — OFFICE VISIT (OUTPATIENT)
Dept: FAMILY MEDICINE CLINIC | Facility: CLINIC | Age: 65
End: 2025-05-06
Payer: COMMERCIAL

## 2025-05-06 VITALS
WEIGHT: 123 LBS | OXYGEN SATURATION: 98 % | BODY MASS INDEX: 19.85 KG/M2 | HEART RATE: 88 BPM | SYSTOLIC BLOOD PRESSURE: 112 MMHG | DIASTOLIC BLOOD PRESSURE: 86 MMHG

## 2025-05-06 DIAGNOSIS — Z13.31 POSITIVE DEPRESSION SCREENING: ICD-10-CM

## 2025-05-06 DIAGNOSIS — N95.1 MENOPAUSAL SYMPTOMS: ICD-10-CM

## 2025-05-06 DIAGNOSIS — R43.0 ABSENT SENSE OF SMELL: Primary | ICD-10-CM

## 2025-05-06 PROCEDURE — 99213 OFFICE O/P EST LOW 20 MIN: CPT | Performed by: NURSE PRACTITIONER

## 2025-05-06 NOTE — ASSESSMENT & PLAN NOTE
Had hysterectomy at age 42. Symptoms of hot flashes, mood swings, sweating-symptoms have gotten worse over the last year.    Referral placed to gyn    Orders:    Ambulatory Referral to Obstetrics / Gynecology; Future

## 2025-05-06 NOTE — PROGRESS NOTES
Name: Virginia Maradiaga      : 1960      MRN: [id number]  Encounter Provider: SHIKHA Arrington  Encounter Date: 2025   Encounter department: [facility]  :  Assessment & Plan  Absent sense of smell  C/o no sense of smell x 3 months. She was treated for a sinus infection in January and since then smell has not returned. She states everything smells like ammonia. No other symptoms present.      Orders:    Ambulatory Referral to Otolaryngology; Future    Menopausal symptoms  Had hysterectomy at age 42. Symptoms of hot flashes, mood swings, sweating-symptoms have gotten worse over the last year.    Referral placed to gyn    Orders:    Ambulatory Referral to Obstetrics / Gynecology; Future    Positive depression screening  Depression screening positive for mild depression. Patient states her mother has some medical issues and she has just been feeling very emotional which may be related to menopause symptoms she has been experiencing. She does go to therapy.               Depression Screening and Follow-up Plan: Patient's depression screening was positive with a PHQ-9 score of 6.   Continue regular follow-up with their mental health provider who is managing their mental health condition(s). Patient is going to therapy.      History of Present Illness   C/o decreased smell x 3 months.  Discuss menopausal symptoms.      Review of Systems   Constitutional:  Negative for activity change and fever.   HENT:  Negative for congestion, facial swelling, nosebleeds, postnasal drip and sinus pain.    Respiratory:  Negative for shortness of breath.    Cardiovascular:  Negative for chest pain.   Gastrointestinal:  Negative for abdominal pain.   Endocrine: Positive for heat intolerance.   Genitourinary:  Negative for difficulty urinating.   Neurological:  Negative for dizziness and weakness.   Psychiatric/Behavioral:  Positive for dysphoric mood. Negative for agitation.        Objective    /86 (BP Location: Left arm, Patient Position: Sitting, Cuff Size: Standard)   Pulse 88   Wt 55.8 kg (123 lb)   SpO2 98%   BMI 19.85 kg/m²      Physical Exam  Vitals and nursing note reviewed.   Constitutional:       General: She is not in acute distress.     Appearance: Normal appearance. She is not ill-appearing.   HENT:      Head: Normocephalic.      Nose: No nasal deformity, nasal tenderness, congestion or rhinorrhea.      Right Turbinates: Not enlarged, swollen or pale.      Left Turbinates: Swollen.   Eyes:      Extraocular Movements: Extraocular movements intact.   Cardiovascular:      Rate and Rhythm: Normal rate and regular rhythm.      Heart sounds: Normal heart sounds.   Pulmonary:      Effort: Pulmonary effort is normal.      Breath sounds: Normal breath sounds.   Skin:     General: Skin is warm and dry.   Neurological:      Mental Status: She is alert and oriented to person, place, and time.   Psychiatric:         Mood and Affect: Mood normal.         Behavior: Behavior normal.

## 2025-05-06 NOTE — ASSESSMENT & PLAN NOTE
C/o no sense of smell x 3 months. She was treated for a sinus infection in January and since then smell has not returned. She states everything smells like ammonia. No other symptoms present.      Orders:    Ambulatory Referral to Otolaryngology; Future

## 2025-05-06 NOTE — PATIENT INSTRUCTIONS
F/up with ENT for loss of smell.  F/up with GYN to discuss menopause symptoms/treatments.  Complete labs as discussed.  Call/return with problems/concerns.

## 2025-05-06 NOTE — ASSESSMENT & PLAN NOTE
Depression screening positive for mild depression. Patient states her mother has some medical issues and she has just been feeling very emotional which may be related to menopause symptoms she has been experiencing. She does go to therapy.